# Patient Record
Sex: MALE | Race: WHITE | Employment: UNEMPLOYED | URBAN - METROPOLITAN AREA
[De-identification: names, ages, dates, MRNs, and addresses within clinical notes are randomized per-mention and may not be internally consistent; named-entity substitution may affect disease eponyms.]

---

## 2017-01-17 DIAGNOSIS — N39.498 OTHER URINARY INCONTINENCE: Primary | ICD-10-CM

## 2017-02-06 DIAGNOSIS — E78.00 PURE HYPERCHOLESTEROLEMIA: Primary | ICD-10-CM

## 2017-02-07 RX ORDER — ATORVASTATIN CALCIUM 20 MG/1
20 TABLET, FILM COATED ORAL DAILY
Qty: 30 TABLET | Refills: 2 | Status: SHIPPED | OUTPATIENT
Start: 2017-02-07 | End: 2017-05-01 | Stop reason: SDUPTHER

## 2017-02-16 ENCOUNTER — HOSPITAL ENCOUNTER (OUTPATIENT)
Age: 37
Setting detail: SPECIMEN
Discharge: HOME | End: 2017-02-16
Payer: MEDICARE

## 2017-02-16 ENCOUNTER — OFFICE VISIT (OUTPATIENT)
Dept: FAMILY MEDICINE CLINIC | Facility: CLINIC | Age: 37
End: 2017-02-16

## 2017-02-16 VITALS
RESPIRATION RATE: 15 BRPM | HEART RATE: 82 BPM | DIASTOLIC BLOOD PRESSURE: 72 MMHG | SYSTOLIC BLOOD PRESSURE: 100 MMHG | TEMPERATURE: 95.8 F

## 2017-02-16 DIAGNOSIS — E78.00 PURE HYPERCHOLESTEROLEMIA: ICD-10-CM

## 2017-02-16 DIAGNOSIS — H00.012 HORDEOLUM EXTERNUM OF RIGHT LOWER EYELID: Primary | ICD-10-CM

## 2017-02-16 LAB
ALT SERPL-CCNC: 40 U/L (ref 5–41)
AST SERPL-CCNC: 31 U/L
CHOLESTEROL/HDL RATIO: 2.9
CHOLESTEROL: 153 MG/DL
FOLATE: >20 NG/ML
HDLC SERPL-MCNC: 52 MG/DL
LDL CHOLESTEROL: 84 MG/DL (ref 0–130)
TRIGL SERPL-MCNC: 85 MG/DL
VLDLC SERPL CALC-MCNC: NORMAL MG/DL (ref 1–30)

## 2017-02-16 PROCEDURE — G8484 FLU IMMUNIZE NO ADMIN: HCPCS | Performed by: NURSE PRACTITIONER

## 2017-02-16 PROCEDURE — 1036F TOBACCO NON-USER: CPT | Performed by: NURSE PRACTITIONER

## 2017-02-16 PROCEDURE — 99213 OFFICE O/P EST LOW 20 MIN: CPT | Performed by: NURSE PRACTITIONER

## 2017-02-16 PROCEDURE — G8427 DOCREV CUR MEDS BY ELIG CLIN: HCPCS | Performed by: NURSE PRACTITIONER

## 2017-02-16 PROCEDURE — G8421 BMI NOT CALCULATED: HCPCS | Performed by: NURSE PRACTITIONER

## 2017-02-16 RX ORDER — ERYTHROMYCIN 5 MG/G
OINTMENT OPHTHALMIC
Qty: 1 G | Refills: 0 | Status: SHIPPED | OUTPATIENT
Start: 2017-02-16 | End: 2017-04-17 | Stop reason: ALTCHOICE

## 2017-02-16 RX ORDER — ERYTHROMYCIN 5 MG/G
OINTMENT OPHTHALMIC
Qty: 1 G | Refills: 0 | Status: SHIPPED | OUTPATIENT
Start: 2017-02-16 | End: 2017-02-16

## 2017-02-16 ASSESSMENT — ENCOUNTER SYMPTOMS
NAUSEA: 0
SORE THROAT: 0
EYE PAIN: 0
EYE REDNESS: 0
EYE ITCHING: 1
VOMITING: 0
SINUS PRESSURE: 0
SHORTNESS OF BREATH: 0
RESPIRATORY NEGATIVE: 1
EYE DISCHARGE: 1

## 2017-03-02 RX ORDER — LEVETIRACETAM 250 MG/1
TABLET ORAL
Qty: 62 TABLET | Refills: 2 | Status: SHIPPED | OUTPATIENT
Start: 2017-03-02 | End: 2017-06-05 | Stop reason: SDUPTHER

## 2017-04-01 DIAGNOSIS — N39.498 OTHER URINARY INCONTINENCE: Primary | ICD-10-CM

## 2017-04-17 ENCOUNTER — OFFICE VISIT (OUTPATIENT)
Dept: FAMILY MEDICINE CLINIC | Age: 37
End: 2017-04-17
Payer: MEDICARE

## 2017-04-17 VITALS
BODY MASS INDEX: 17.75 KG/M2 | SYSTOLIC BLOOD PRESSURE: 104 MMHG | HEART RATE: 88 BPM | TEMPERATURE: 96.4 F | DIASTOLIC BLOOD PRESSURE: 62 MMHG | WEIGHT: 123.7 LBS | RESPIRATION RATE: 20 BRPM

## 2017-04-17 DIAGNOSIS — H11.221: ICD-10-CM

## 2017-04-17 DIAGNOSIS — F07.81 POST TRAUMATIC ENCEPHALOPATHY: ICD-10-CM

## 2017-04-17 DIAGNOSIS — E78.00 PURE HYPERCHOLESTEROLEMIA: Primary | ICD-10-CM

## 2017-04-17 DIAGNOSIS — S06.9X9S TBI (TRAUMATIC BRAIN INJURY), WITH LOSS OF CONSCIOUSNESS OF UNSPECIFIED DURATION, SEQUELA: ICD-10-CM

## 2017-04-17 DIAGNOSIS — G40.804 OTHER INTRACTABLE EPILEPSY WITHOUT STATUS EPILEPTICUS (HCC): ICD-10-CM

## 2017-04-17 PROCEDURE — G8419 CALC BMI OUT NRM PARAM NOF/U: HCPCS | Performed by: PEDIATRICS

## 2017-04-17 PROCEDURE — G8427 DOCREV CUR MEDS BY ELIG CLIN: HCPCS | Performed by: PEDIATRICS

## 2017-04-17 PROCEDURE — 99214 OFFICE O/P EST MOD 30 MIN: CPT | Performed by: PEDIATRICS

## 2017-04-17 PROCEDURE — 1036F TOBACCO NON-USER: CPT | Performed by: PEDIATRICS

## 2017-04-17 ASSESSMENT — PATIENT HEALTH QUESTIONNAIRE - PHQ9
SUM OF ALL RESPONSES TO PHQ9 QUESTIONS 1 & 2: 0
SUM OF ALL RESPONSES TO PHQ QUESTIONS 1-9: 0
1. LITTLE INTEREST OR PLEASURE IN DOING THINGS: 0
2. FEELING DOWN, DEPRESSED OR HOPELESS: 0

## 2017-04-17 ASSESSMENT — ENCOUNTER SYMPTOMS
EYE DISCHARGE: 1
DIARRHEA: 0
NAUSEA: 0
WHEEZING: 0
COUGH: 0
CONSTIPATION: 0
SHORTNESS OF BREATH: 0

## 2017-05-01 DIAGNOSIS — N39.498 OTHER URINARY INCONTINENCE: ICD-10-CM

## 2017-05-01 DIAGNOSIS — E78.00 PURE HYPERCHOLESTEROLEMIA: ICD-10-CM

## 2017-05-01 RX ORDER — ATORVASTATIN CALCIUM 20 MG/1
20 TABLET, FILM COATED ORAL EVERY EVENING
Qty: 30 TABLET | Refills: 9 | Status: SHIPPED | OUTPATIENT
Start: 2017-05-01 | End: 2018-02-27 | Stop reason: SDUPTHER

## 2017-05-01 RX ORDER — FESOTERODINE FUMARATE 8 MG/1
1 TABLET, EXTENDED RELEASE ORAL NIGHTLY
Qty: 31 TABLET | Refills: 5 | Status: SHIPPED | OUTPATIENT
Start: 2017-05-01 | End: 2017-11-06 | Stop reason: SDUPTHER

## 2017-05-23 RX ORDER — FOLIC ACID 1 MG/1
1 TABLET ORAL DAILY
Qty: 30 TABLET | Refills: 11 | Status: SHIPPED | OUTPATIENT
Start: 2017-05-23 | End: 2018-03-27 | Stop reason: SDUPTHER

## 2017-07-10 ENCOUNTER — TELEPHONE (OUTPATIENT)
Dept: FAMILY MEDICINE CLINIC | Age: 37
End: 2017-07-10

## 2017-07-10 RX ORDER — ACYCLOVIR 400 MG/1
400 TABLET ORAL 3 TIMES DAILY
Qty: 15 TABLET | Refills: 1 | Status: SHIPPED | OUTPATIENT
Start: 2017-07-10 | End: 2017-07-15

## 2017-07-10 RX ORDER — ACYCLOVIR 50 MG/G
OINTMENT TOPICAL
Qty: 15 G | Refills: 1 | Status: SHIPPED | OUTPATIENT
Start: 2017-07-10 | End: 2017-07-10

## 2017-08-17 ENCOUNTER — HOSPITAL ENCOUNTER (OUTPATIENT)
Age: 37
Setting detail: SPECIMEN
Discharge: HOME | End: 2017-08-17
Payer: MEDICARE

## 2017-08-21 LAB — SURGICAL PATHOLOGY REPORT: NORMAL

## 2017-09-08 RX ORDER — LEVETIRACETAM 250 MG/1
TABLET ORAL
Qty: 62 TABLET | Refills: 0 | Status: SHIPPED | OUTPATIENT
Start: 2017-09-08 | End: 2019-01-22 | Stop reason: SDUPTHER

## 2017-09-12 ENCOUNTER — OFFICE VISIT (OUTPATIENT)
Dept: NEUROLOGY | Age: 37
End: 2017-09-12
Payer: MEDICARE

## 2017-09-12 VITALS — SYSTOLIC BLOOD PRESSURE: 92 MMHG | HEART RATE: 67 BPM | DIASTOLIC BLOOD PRESSURE: 69 MMHG

## 2017-09-12 DIAGNOSIS — G40.909 SEIZURE DISORDER (HCC): ICD-10-CM

## 2017-09-12 DIAGNOSIS — F07.81 POST TRAUMATIC ENCEPHALOPATHY: Primary | ICD-10-CM

## 2017-09-12 PROCEDURE — G8418 CALC BMI BLW LOW PARAM F/U: HCPCS | Performed by: PSYCHIATRY & NEUROLOGY

## 2017-09-12 PROCEDURE — 1036F TOBACCO NON-USER: CPT | Performed by: PSYCHIATRY & NEUROLOGY

## 2017-09-12 PROCEDURE — 99214 OFFICE O/P EST MOD 30 MIN: CPT | Performed by: PSYCHIATRY & NEUROLOGY

## 2017-09-12 PROCEDURE — G8427 DOCREV CUR MEDS BY ELIG CLIN: HCPCS | Performed by: PSYCHIATRY & NEUROLOGY

## 2017-09-12 RX ORDER — LEVETIRACETAM 250 MG/1
TABLET ORAL
Qty: 60 TABLET | Refills: 11 | Status: SHIPPED | OUTPATIENT
Start: 2017-09-12 | End: 2018-09-18 | Stop reason: SDUPTHER

## 2017-09-12 ASSESSMENT — ENCOUNTER SYMPTOMS
GASTROINTESTINAL NEGATIVE: 1
EYES NEGATIVE: 1
RESPIRATORY NEGATIVE: 1

## 2017-10-02 RX ORDER — POLYETHYLENE GLYCOL 3350 17 G/17G
POWDER, FOR SOLUTION ORAL
Qty: 527 G | Refills: 0 | Status: SHIPPED | OUTPATIENT
Start: 2017-10-02 | End: 2018-01-24 | Stop reason: SDUPTHER

## 2017-10-02 NOTE — TELEPHONE ENCOUNTER
LOV: 04/17/17  LR: 08/24/16    Health Maintenance   Topic Date Due    HIV screen  12/05/1994    Flu vaccine (1) 09/01/2017    DTaP/Tdap/Td vaccine (2 - Td) 04/25/2024             (applicable per patient's age: Cancer Screenings, Depression Screening, Fall Risk Screening, Immunizations)    LDL Cholesterol (mg/dL)   Date Value   02/16/2017 84     LDL Calculated (mg/dL)   Date Value   04/19/2016 104     AST (U/L)   Date Value   02/16/2017 31     ALT (U/L)   Date Value   02/16/2017 40     BUN (mg/dL)   Date Value   04/19/2016 7      (goal A1C is < 7)   (goal LDL is <100) need 30-50% reduction from baseline     BP Readings from Last 3 Encounters:   09/12/17 92/69   04/17/17 104/62   02/16/17 100/72    (goal /80)      All Future Testing planned in [facility]:  Lab Frequency Next Occurrence       Next Visit Date:  Future Appointments  Date Time Provider Cele Xiong   9/25/2018 2:40 PM Areli Santoyo MD Neuro Spec [facility]            Patient Active Problem List:     TBI (traumatic brain injury) [facility])     Hyperlipidemia     Intractable epilepsy ([facility] [address]     Narcolepsy     Urinary incontinence     Post traumatic encephalopathy

## 2017-10-16 DIAGNOSIS — E55.9 VITAMIN D DEFICIENCY: ICD-10-CM

## 2017-10-17 NOTE — TELEPHONE ENCOUNTER
Last refill was 10/6/2016 #30-11  Last visit was 4/17/2017 RTO 6 months  Lm for parent to call the office and follow up.    Health Maintenance   Topic Date Due    HIV screen  12/05/1994    Flu vaccine (1) 09/01/2017    DTaP/Tdap/Td vaccine (2 - Td) 04/25/2024             (applicable per patient's age: Cancer Screenings, Depression Screening, Fall Risk Screening, Immunizations)    LDL Cholesterol (mg/dL)   Date Value   02/16/2017 84     LDL Calculated (mg/dL)   Date Value   04/19/2016 104     AST (U/L)   Date Value   02/16/2017 31     ALT (U/L)   Date Value   02/16/2017 40     BUN (mg/dL)   Date Value   04/19/2016 7      (goal A1C is < 7)   (goal LDL is <100) need 30-50% reduction from baseline     BP Readings from Last 3 Encounters:   09/12/17 92/69   04/17/17 104/62   02/16/17 100/72    (goal /80)      All Future Testing planned in [facility]:  Lab Frequency Next Occurrence       Next Visit Date:  Future Appointments  Date Time Provider Cele Xiong   9/25/2018 2:40 PM Maurizio Hernández MD Neuro Spec [facility]            Patient Active Problem List:     TBI (traumatic brain injury) [facility])     Hyperlipidemia     Intractable epilepsy ([facility] [address]     Narcolepsy     Urinary incontinence     Post traumatic encephalopathy

## 2017-10-18 RX ORDER — CHOLECALCIFEROL (VITAMIN D3) 50 MCG
2000 TABLET ORAL DAILY
Qty: 30 TABLET | Refills: 11 | Status: SHIPPED | OUTPATIENT
Start: 2017-10-18 | End: 2018-10-03 | Stop reason: SDUPTHER

## 2017-11-06 DIAGNOSIS — N39.498 OTHER URINARY INCONTINENCE: ICD-10-CM

## 2017-11-07 RX ORDER — FESOTERODINE FUMARATE 8 MG/1
1 TABLET, EXTENDED RELEASE ORAL NIGHTLY
Qty: 30 TABLET | Refills: 0 | Status: SHIPPED | OUTPATIENT
Start: 2017-11-07 | End: 2017-11-30 | Stop reason: SDUPTHER

## 2017-11-07 NOTE — TELEPHONE ENCOUNTER
OV 9/12/17  LRF 5/1/17    Health Maintenance   Topic Date Due    HIV screen  12/05/1994    Flu vaccine (1) 09/01/2017    DTaP/Tdap/Td vaccine (2 - Td) 04/25/2024             (applicable per patient's age: Cancer Screenings, Depression Screening, Fall Risk Screening, Immunizations)    LDL Cholesterol (mg/dL)   Date Value   02/16/2017 84     LDL Calculated (mg/dL)   Date Value   04/19/2016 104     AST (U/L)   Date Value   02/16/2017 31     ALT (U/L)   Date Value   02/16/2017 40     BUN (mg/dL)   Date Value   04/19/2016 7      (goal A1C is < 7)   (goal LDL is <100) need 30-50% reduction from baseline     BP Readings from Last 3 Encounters:   09/12/17 92/69   04/17/17 104/62   02/16/17 100/72    (goal /80)      All Future Testing planned in [facility]:  Lab Frequency Next Occurrence       Next Visit Date:  Future Appointments  Date Time Provider Cele Xiong   9/25/2018 2:40 PM Keisha Samson MD Neuro Spec [address] [facility]            Patient Active Problem List:     TBI (traumatic brain injury) [facility])     Hyperlipidemia     Intractable epilepsy ([facility] [address]     Narcolepsy     Urinary incontinence     Post traumatic encephalopathy

## 2017-11-30 DIAGNOSIS — N39.498 OTHER URINARY INCONTINENCE: ICD-10-CM

## 2017-12-01 RX ORDER — FESOTERODINE FUMARATE 8 MG/1
1 TABLET, EXTENDED RELEASE ORAL DAILY
Qty: 30 TABLET | Refills: 1 | Status: SHIPPED | OUTPATIENT
Start: 2017-12-01 | End: 2018-01-25 | Stop reason: SDUPTHER

## 2017-12-01 NOTE — TELEPHONE ENCOUNTER
LOV: 04/17/17  RTO: in 6 mths.   [facility] for pt/family to contact our office to schedule)  LR: 11/07/17    Health Maintenance   Topic Date Due    HIV screen  12/05/1994    Flu vaccine (1) 09/01/2017    DTaP/Tdap/Td vaccine (2 - Td) 04/25/2024             (applicable per patient's age: Cancer Screenings, Depression Screening, Fall Risk Screening, Immunizations)    LDL Cholesterol (mg/dL)   Date Value   02/16/2017 84     LDL Calculated (mg/dL)   Date Value   04/19/2016 104     AST (U/L)   Date Value   02/16/2017 31     ALT (U/L)   Date Value   02/16/2017 40     BUN (mg/dL)   Date Value   04/19/2016 7      (goal A1C is < 7)   (goal LDL is <100) need 30-50% reduction from baseline     BP Readings from Last 3 Encounters:   09/12/17 92/69   04/17/17 104/62   02/16/17 100/72    (goal /80)      All Future Testing planned in [facility]:  Lab Frequency Next Occurrence       Next Visit Date:  Future Appointments  Date Time Provider Cele Xiong   9/25/2018 2:40 PM Mauro Benitez MD Neuro Spec [address]            Patient Active Problem List:     TBI (traumatic brain injury) [facility])     Hyperlipidemia     Intractable epilepsy ([facility] [address]     Narcolepsy     Urinary incontinence     Post traumatic encephalopathy

## 2018-02-27 DIAGNOSIS — B37.2 CANDIDAL DERMATITIS: ICD-10-CM

## 2018-02-27 DIAGNOSIS — E78.00 PURE HYPERCHOLESTEROLEMIA: Primary | ICD-10-CM

## 2018-02-28 RX ORDER — NYSTATIN 100000 [USP'U]/G
POWDER TOPICAL
Qty: 30 G | Refills: 3 | Status: SHIPPED | OUTPATIENT
Start: 2018-02-28 | End: 2018-09-18 | Stop reason: ALTCHOICE

## 2018-02-28 RX ORDER — ATORVASTATIN CALCIUM 20 MG/1
20 TABLET, FILM COATED ORAL DAILY
Qty: 30 TABLET | Refills: 1 | Status: SHIPPED | OUTPATIENT
Start: 2018-02-28 | End: 2018-05-08 | Stop reason: SDUPTHER

## 2018-03-27 DIAGNOSIS — N39.498 OTHER URINARY INCONTINENCE: ICD-10-CM

## 2018-03-29 RX ORDER — FESOTERODINE FUMARATE 8 MG/1
1 TABLET, EXTENDED RELEASE ORAL DAILY
Qty: 30 TABLET | Refills: 2 | Status: SHIPPED | OUTPATIENT
Start: 2018-03-29 | End: 2018-05-01 | Stop reason: SDUPTHER

## 2018-03-29 RX ORDER — FOLIC ACID 1 MG/1
1 TABLET ORAL DAILY
Qty: 30 TABLET | Refills: 2 | Status: SHIPPED | OUTPATIENT
Start: 2018-03-29 | End: 2018-07-10 | Stop reason: SDUPTHER

## 2018-05-01 ENCOUNTER — OFFICE VISIT (OUTPATIENT)
Dept: FAMILY MEDICINE CLINIC | Age: 38
End: 2018-05-01
Payer: MEDICARE

## 2018-05-01 VITALS
SYSTOLIC BLOOD PRESSURE: 124 MMHG | HEART RATE: 76 BPM | DIASTOLIC BLOOD PRESSURE: 64 MMHG | WEIGHT: 121.1 LBS | TEMPERATURE: 99.8 F | RESPIRATION RATE: 18 BRPM | BODY MASS INDEX: 17.38 KG/M2

## 2018-05-01 DIAGNOSIS — N39.498 OTHER URINARY INCONTINENCE: ICD-10-CM

## 2018-05-01 DIAGNOSIS — B37.0 ORAL THRUSH: ICD-10-CM

## 2018-05-01 DIAGNOSIS — E78.00 PURE HYPERCHOLESTEROLEMIA: ICD-10-CM

## 2018-05-01 DIAGNOSIS — Z11.4 ENCOUNTER FOR SCREENING FOR HIV: ICD-10-CM

## 2018-05-01 DIAGNOSIS — Z00.00 PHYSICAL EXAM, ANNUAL: Primary | ICD-10-CM

## 2018-05-01 DIAGNOSIS — G40.804 OTHER INTRACTABLE EPILEPSY WITHOUT STATUS EPILEPTICUS (HCC): ICD-10-CM

## 2018-05-01 PROCEDURE — G0439 PPPS, SUBSEQ VISIT: HCPCS | Performed by: NURSE PRACTITIONER

## 2018-05-01 RX ORDER — FESOTERODINE FUMARATE 8 MG/1
1 TABLET, EXTENDED RELEASE ORAL NIGHTLY
Qty: 30 TABLET | Refills: 2 | Status: SHIPPED | OUTPATIENT
Start: 2018-05-01 | End: 2018-10-02 | Stop reason: SDUPTHER

## 2018-05-01 ASSESSMENT — ENCOUNTER SYMPTOMS
EYE DISCHARGE: 0
NAUSEA: 0
SHORTNESS OF BREATH: 0
COUGH: 0
VOMITING: 0
WHEEZING: 0
ABDOMINAL DISTENTION: 0
BACK PAIN: 0
DIARRHEA: 0
EYE REDNESS: 0
SINUS PRESSURE: 0
CHEST TIGHTNESS: 0
EYE PAIN: 0
ABDOMINAL PAIN: 0
RHINORRHEA: 0
BLOOD IN STOOL: 0
SORE THROAT: 0

## 2018-05-01 ASSESSMENT — PATIENT HEALTH QUESTIONNAIRE - PHQ9
SUM OF ALL RESPONSES TO PHQ QUESTIONS 1-9: 0
SUM OF ALL RESPONSES TO PHQ9 QUESTIONS 1 & 2: 0
1. LITTLE INTEREST OR PLEASURE IN DOING THINGS: 0
2. FEELING DOWN, DEPRESSED OR HOPELESS: 0

## 2018-05-08 DIAGNOSIS — E78.00 PURE HYPERCHOLESTEROLEMIA: ICD-10-CM

## 2018-05-08 RX ORDER — ATORVASTATIN CALCIUM 20 MG/1
20 TABLET, FILM COATED ORAL DAILY
Qty: 30 TABLET | Refills: 5 | Status: SHIPPED | OUTPATIENT
Start: 2018-05-08 | End: 2018-11-06 | Stop reason: SDUPTHER

## 2018-05-30 DIAGNOSIS — B37.0 ORAL THRUSH: ICD-10-CM

## 2018-06-02 DIAGNOSIS — B37.0 ORAL THRUSH: ICD-10-CM

## 2018-06-12 ENCOUNTER — HOSPITAL ENCOUNTER (OUTPATIENT)
Age: 38
Setting detail: SPECIMEN
Discharge: HOME | End: 2018-06-12
Payer: MEDICARE

## 2018-06-12 LAB
ALBUMIN SERPL-MCNC: 4.4 G/DL (ref 3.5–5.2)
ALBUMIN/GLOBULIN RATIO: 1.5 (ref 1–2.5)
ALP BLD-CCNC: 74 U/L (ref 40–129)
ALT SERPL-CCNC: 65 U/L (ref 5–41)
ANION GAP SERPL CALCULATED.3IONS-SCNC: 15 MMOL/L (ref 9–17)
AST SERPL-CCNC: 47 U/L
BILIRUB SERPL-MCNC: 0.61 MG/DL (ref 0.3–1.2)
BUN BLDV-MCNC: 8 MG/DL (ref 6–20)
BUN/CREAT BLD: ABNORMAL (ref 9–20)
CALCIUM SERPL-MCNC: 9.1 MG/DL (ref 8.6–10.4)
CHLORIDE BLD-SCNC: 98 MMOL/L (ref 98–107)
CHOLESTEROL, FASTING: 146 MG/DL
CHOLESTEROL/HDL RATIO: 3.2
CO2: 27 MMOL/L (ref 20–31)
CREAT SERPL-MCNC: 0.79 MG/DL (ref 0.7–1.2)
GFR AFRICAN AMERICAN: >60 ML/MIN
GFR NON-AFRICAN AMERICAN: >60 ML/MIN
GFR SERPL CREATININE-BSD FRML MDRD: ABNORMAL ML/MIN/{1.73_M2}
GFR SERPL CREATININE-BSD FRML MDRD: ABNORMAL ML/MIN/{1.73_M2}
GLUCOSE FASTING: 73 MG/DL (ref 70–99)
HDLC SERPL-MCNC: 46 MG/DL
LDL CHOLESTEROL: 84 MG/DL (ref 0–130)
POTASSIUM SERPL-SCNC: 4.5 MMOL/L (ref 3.7–5.3)
SODIUM BLD-SCNC: 140 MMOL/L (ref 135–144)
TOTAL PROTEIN: 7.3 G/DL (ref 6.4–8.3)
TRIGLYCERIDE, FASTING: 79 MG/DL
VLDLC SERPL CALC-MCNC: NORMAL MG/DL (ref 1–30)

## 2018-06-13 DIAGNOSIS — R74.8 ELEVATED LIVER ENZYMES: Primary | ICD-10-CM

## 2018-06-13 LAB — HIV AG/AB: NONREACTIVE

## 2018-07-12 RX ORDER — FOLIC ACID 1 MG/1
1 TABLET ORAL DAILY
Qty: 30 TABLET | Refills: 5 | Status: SHIPPED | OUTPATIENT
Start: 2018-07-12 | End: 2019-01-17 | Stop reason: SDUPTHER

## 2018-07-16 DIAGNOSIS — N39.498 OTHER URINARY INCONTINENCE: Primary | ICD-10-CM

## 2018-07-16 NOTE — TELEPHONE ENCOUNTER
Unable to find each only given bottle or ML for choices . LOV was 5-1-18, LR was 4-3-17 on underpads and 1-18-17 on underwear. cm  Next Visit Date:  Future Appointments  Date Time Provider Cele Xiong   9/18/2018 4:20 PM Erik Pitts MD Neuro Spec [address] Maintenance   Topic Date Due    Flu vaccine (1) 09/01/2018    DTaP/Tdap/Td vaccine (2 - Td) 04/25/2024    HIV screen  Completed       No results found for: LABA1C          ( goal A1C is < 7)   No results found for: LABMICR  LDL Cholesterol (mg/dL)   Date Value   06/12/2018 84   02/16/2017 84     LDL Calculated (mg/dL)   Date Value   04/19/2016 104   05/26/2015 88       (goal LDL is <100)   AST (U/L)   Date Value   06/12/2018 47 (H)     ALT (U/L)   Date Value   06/12/2018 65 (H)     BUN (mg/dL)   Date Value   06/12/2018 8     BP Readings from Last 3 Encounters:   05/01/18 124/64   09/12/17 92/69   04/17/17 104/62          (goal 120/80)    All Future Testing planned in [facility]  Lab Frequency Next Occurrence   Lipid Panel Once 03/05/2018   Comprehensive Metabolic Panel Once 21/58/0749   HIV Screen Once 05/01/2018   ALT Once 07/25/2018   AST Once 07/25/2018               Patient Active Problem List:     TBI (traumatic brain injury) ([facility] [address]     Hyperlipidemia     Intractable epilepsy ([facility] [address]     Narcolepsy     Urinary incontinence     Post traumatic encephalopathy

## 2018-09-18 ENCOUNTER — OFFICE VISIT (OUTPATIENT)
Dept: NEUROLOGY | Age: 38
End: 2018-09-18
Payer: MEDICARE

## 2018-09-18 VITALS
SYSTOLIC BLOOD PRESSURE: 106 MMHG | DIASTOLIC BLOOD PRESSURE: 74 MMHG | WEIGHT: 140 LBS | BODY MASS INDEX: 20.04 KG/M2 | HEIGHT: 70 IN | HEART RATE: 60 BPM

## 2018-09-18 DIAGNOSIS — G40.909 SEIZURE DISORDER (HCC): ICD-10-CM

## 2018-09-18 DIAGNOSIS — F07.81 POST TRAUMATIC ENCEPHALOPATHY: Primary | ICD-10-CM

## 2018-09-18 PROCEDURE — 1036F TOBACCO NON-USER: CPT | Performed by: PSYCHIATRY & NEUROLOGY

## 2018-09-18 PROCEDURE — G8420 CALC BMI NORM PARAMETERS: HCPCS | Performed by: PSYCHIATRY & NEUROLOGY

## 2018-09-18 PROCEDURE — G8427 DOCREV CUR MEDS BY ELIG CLIN: HCPCS | Performed by: PSYCHIATRY & NEUROLOGY

## 2018-09-18 PROCEDURE — 99214 OFFICE O/P EST MOD 30 MIN: CPT | Performed by: PSYCHIATRY & NEUROLOGY

## 2018-09-18 RX ORDER — LEVETIRACETAM 250 MG/1
TABLET ORAL
Qty: 60 TABLET | Refills: 11 | Status: SHIPPED | OUTPATIENT
Start: 2018-09-18 | End: 2019-10-08 | Stop reason: SDUPTHER

## 2018-09-18 ASSESSMENT — ENCOUNTER SYMPTOMS
RESPIRATORY NEGATIVE: 1
EYES NEGATIVE: 1
GASTROINTESTINAL NEGATIVE: 1

## 2018-09-18 NOTE — LETTER
sleep latency 9.5 minutes with 2 out of 4 REM onset periods. MRI of Head with left frontal and temporal along with posterior right temporal and right inferior cerebellar encephalomalacia , June 2014 .  Depakote level 84, May 2015      Past Medical History:   Diagnosis Date    Adjustment disorder     Dementia     Dystonia     Encephalopathy     Encephalopathy, unspecified     Esophageal reflux     Hypercholesteremia     Hyperlipidemia     Localization-related (focal) (partial) epilepsy and epileptic syndromes with simple partial seizures, without mention of intractable epilepsy     Neuropathy     Neuropathy     Persistent disorder of initiating or maintaining wakefulness     Unspecified epilepsy with intractable epilepsy        Past Surgical History:   Procedure Laterality Date    CRANIECTOMY  5/28/2004    CRANIOPLASTY  9/15/2004    EYE SURGERY Right     stye removal    OTHER SURGICAL HISTORY  5/28/2004    ICP Monitor       Family History   Problem Relation Age of Onset    Cancer Maternal Grandmother     High Cholesterol Maternal Grandmother     Other Maternal Grandmother         COPD    Coronary Art Dis Maternal Grandfather     Cancer Other         breast    Diabetes Other     Heart Disease Other     Hypertension Other     Migraines Other        Social History     Social History    Marital status: Single     Spouse name: N/A    Number of children: N/A    Years of education: N/A     Social History Main Topics    Smoking status: Former Smoker     Packs/day: 0.50     Quit date: 1/1/2012    Smokeless tobacco: Never Used    Alcohol use 0.0 oz/week      Comment: occasionally    Drug use: No      Comment: past use only    Sexual activity: Not Asked      Comment: past use     Other Topics Concern    None     Social History Narrative    None       Current Outpatient Prescriptions   Medication Sig Dispense Refill    levETIRAcetam (KEPPRA) 250 MG tablet Take 1 tablet by mouth 2 times daily - Every 12 hours 60 tablet 11    Incontinence Supply Disposable (DRI-SORB UNDERPAD) MISC Use as needed for incontinent episodes 180 Bottle 5    Incontinence Supply Disposable (ATTENDS UNDERWEAR 7 MEDIUM) MISC Use twice a day and as needed for incontinent episodes. 994 Bottle 5    folic acid (FOLVITE) 1 MG tablet Take 1 tablet by mouth daily 30 tablet 5    Oral Hygiene Products SWAB Take 1 applicator by mouth 3 times daily 30 each 0    atorvastatin (LIPITOR) 20 MG tablet Take 1 tablet by mouth daily 30 tablet 5    Fesoterodine Fumarate ER (TOVIAZ) 8 MG TB24 Take 1 tablet by mouth nightly 30 tablet 2    polyethylene glycol (GLYCOLAX) powder TAKE 1/2 CAPFUL MIXED WITH LIQUID ON SUNDAY, TUESDAY, THURSDAY MORNINGS. MAY HOLD DOSE IF HAS DIARRHEA. 527 g 1    Cholecalciferol (VITAMIN D) 2000 units TABS tablet Take 1 tablet by mouth daily 30 tablet 11    levETIRAcetam (KEPPRA) 250 MG tablet TAKE 1 TAB BY MOUTH TWICE A DAY EVERY 12 HOURS 62 tablet 0    Incontinence Supply Disposable (DEPEND GUARDS FOR MEN) MISC Use As directed twice a day 180 each 11    Incontinence Supply Disposable (PREVAIL SUPER PLUS SM/MED) MISC 6 each by Other route daily 180 each 11     No current facility-administered medications for this visit. Allergies   Allergen Reactions    Provigil [Modafinil]      Felt like bugs crawling on him           Review of Systems   Constitutional: Positive for fatigue. HENT: Negative. Eyes: Negative. Respiratory: Negative. Cardiovascular: Negative. Gastrointestinal: Negative. Endocrine: Negative. Genitourinary: Positive for frequency and urgency. Musculoskeletal: Positive for gait problem. Skin: Negative. Neurological: Positive for tremors, speech difficulty and weakness. Psychiatric/Behavioral: Negative.         Objective:   Physical Exam    Vitals:    09/18/18 1537   BP: 106/74   Pulse: 60       Neurological Examination  Constitutional .General exam well groomed

## 2018-09-18 NOTE — PROGRESS NOTES
Subjective:      Patient ID: Pennie Sweeney is a 44 y.o. male. HPI    Active Problem post traumatic encephalopathy with previous parenchymal contusions along with hemorrhage needing bifrontal craniotomies. Patient with baseline cognitive dysfunction , unsteady gait along with seizure disorder being on keppra . 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 The condition is he is living in his own appartment with 24 hours staff spending time at home two days a week . His mother reports good mood with occasional dysphoria . There is no agitation or behavioral disturbance. He is on keppra with no seizures . There is occasional paranoia or delusions . There is mild daytime sleepiness  . He remains wheelchair bound with baseline ataxia. Neck dystonia is gone . Significant medications keppra 250 mg po bid. He has been on provigil, neurontin, lyrica, cymbalta , seroquel, risperdal , elavil , atarax , zyprexa , depakote and requip in the past . Testing Head CT with encephalomalacia left frontal parietal and right parietal occipital with bilateral frontal craniotomies , February 2007. LTME no seizure activity seen, Polysomnogram apnea hypopnea index 6 episodes per hour. MSLT mean sleep latency 9.5 minutes with 2 out of 4 REM onset periods. MRI of Head with left frontal and temporal along with posterior right temporal and right inferior cerebellar encephalomalacia , June 2014 .  Depakote level 84, May 2015      Past Medical History:   Diagnosis Date    Adjustment disorder     Dementia     Dystonia     Encephalopathy     Encephalopathy, unspecified     Esophageal reflux     Hypercholesteremia     Hyperlipidemia     Localization-related (focal) (partial) epilepsy and epileptic syndromes with simple partial seizures, without mention of intractable epilepsy

## 2018-09-19 NOTE — COMMUNICATION BODY
Subjective:      Patient ID: Phuong Renee is a 44 y.o. male. HPI    Active Problem post traumatic encephalopathy with previous parenchymal contusions along with hemorrhage needing bifrontal craniotomies. Patient with baseline cognitive dysfunction , unsteady gait along with seizure disorder being on keppra . 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 The condition is he is living in his own appartment with 24 hours staff spending time at home two days a week . His mother reports good mood with occasional dysphoria . There is no agitation or behavioral disturbance. He is on keppra with no seizures . There is occasional paranoia or delusions . There is mild daytime sleepiness  . He remains wheelchair bound with baseline ataxia. Neck dystonia is gone . Significant medications keppra 250 mg po bid. He has been on provigil, neurontin, lyrica, cymbalta , seroquel, risperdal , elavil , atarax , zyprexa , depakote and requip in the past . Testing Head CT with encephalomalacia left frontal parietal and right parietal occipital with bilateral frontal craniotomies , February 2007. LTME no seizure activity seen, Polysomnogram apnea hypopnea index 6 episodes per hour. MSLT mean sleep latency 9.5 minutes with 2 out of 4 REM onset periods. MRI of Head with left frontal and temporal along with posterior right temporal and right inferior cerebellar encephalomalacia , June 2014 .  Depakote level 84, May 2015      Past Medical History:   Diagnosis Date    Adjustment disorder     Dementia     Dystonia     Encephalopathy     Encephalopathy, unspecified     Esophageal reflux     Hypercholesteremia     Hyperlipidemia     Localization-related (focal) (partial) epilepsy and epileptic syndromes with simple partial seizures, without mention of intractable epilepsy nightly 30 tablet 2    polyethylene glycol (GLYCOLAX) powder TAKE 1/2 CAPFUL MIXED WITH LIQUID ON SUNDAY, TUESDAY, THURSDAY MORNINGS. MAY HOLD DOSE IF HAS DIARRHEA. 527 g 1    Cholecalciferol (VITAMIN D) 2000 units TABS tablet Take 1 tablet by mouth daily 30 tablet 11    levETIRAcetam (KEPPRA) 250 MG tablet TAKE 1 TAB BY MOUTH TWICE A DAY EVERY 12 HOURS 62 tablet 0    Incontinence Supply Disposable (DEPEND GUARDS FOR MEN) MISC Use As directed twice a day 180 each 11    Incontinence Supply Disposable (PREVAIL SUPER PLUS SM/MED) MISC 6 each by Other route daily 180 each 11     No current facility-administered medications for this visit. Allergies   Allergen Reactions    Provigil [Modafinil]      Felt like bugs crawling on him           Review of Systems   Constitutional: Positive for fatigue. HENT: Negative. Eyes: Negative. Respiratory: Negative. Cardiovascular: Negative. Gastrointestinal: Negative. Endocrine: Negative. Genitourinary: Positive for frequency and urgency. Musculoskeletal: Positive for gait problem. Skin: Negative. Neurological: Positive for tremors, speech difficulty and weakness. Psychiatric/Behavioral: Negative. Objective:   Physical Exam    Vitals:    09/18/18 1537   BP: 106/74   Pulse: 60       Neurological Examination  Constitutional .General exam well groomed   Ears /Nose/Throat: external ear . Normal exam  Neck and thyroid . Normal size  Respiratory . Breathsounds clear bilaterally  Cardiovascular:  Auscultation of heart with regular rate and rhythm   Musculoskeletal. Neck dystonia                                                               Muscle strength lifting all limbs equally                                                                                 Dysmetria all limbs  Head tremor   Normal fine motor  Ataxic gait    Orientation Alert not oriented   Attention and concentration normal   Short term memory normal   Language process and

## 2018-10-02 DIAGNOSIS — N39.498 OTHER URINARY INCONTINENCE: ICD-10-CM

## 2018-10-03 DIAGNOSIS — E55.9 VITAMIN D DEFICIENCY: ICD-10-CM

## 2018-10-03 RX ORDER — FESOTERODINE FUMARATE 8 MG/1
1 TABLET, EXTENDED RELEASE ORAL NIGHTLY
Qty: 30 TABLET | Refills: 2 | Status: SHIPPED | OUTPATIENT
Start: 2018-10-03 | End: 2019-01-07 | Stop reason: SDUPTHER

## 2018-10-04 RX ORDER — CHOLECALCIFEROL (VITAMIN D3) 50 MCG
2000 TABLET ORAL DAILY
Qty: 30 TABLET | Refills: 5 | Status: SHIPPED | OUTPATIENT
Start: 2018-10-04 | End: 2019-04-03 | Stop reason: SDUPTHER

## 2018-10-10 ENCOUNTER — OFFICE VISIT (OUTPATIENT)
Dept: FAMILY MEDICINE CLINIC | Age: 38
End: 2018-10-10
Payer: MEDICARE

## 2018-10-10 VITALS
TEMPERATURE: 96.2 F | SYSTOLIC BLOOD PRESSURE: 110 MMHG | RESPIRATION RATE: 20 BRPM | HEART RATE: 68 BPM | HEIGHT: 70 IN | BODY MASS INDEX: 20.04 KG/M2 | WEIGHT: 140 LBS | DIASTOLIC BLOOD PRESSURE: 74 MMHG

## 2018-10-10 DIAGNOSIS — B37.0 ORAL THRUSH: Primary | ICD-10-CM

## 2018-10-10 DIAGNOSIS — M20.42 HAMMER TOE OF SECOND TOE OF LEFT FOOT: ICD-10-CM

## 2018-10-10 PROCEDURE — G8427 DOCREV CUR MEDS BY ELIG CLIN: HCPCS | Performed by: NURSE PRACTITIONER

## 2018-10-10 PROCEDURE — 1036F TOBACCO NON-USER: CPT | Performed by: NURSE PRACTITIONER

## 2018-10-10 PROCEDURE — G8484 FLU IMMUNIZE NO ADMIN: HCPCS | Performed by: NURSE PRACTITIONER

## 2018-10-10 PROCEDURE — 99213 OFFICE O/P EST LOW 20 MIN: CPT | Performed by: NURSE PRACTITIONER

## 2018-10-10 PROCEDURE — G8420 CALC BMI NORM PARAMETERS: HCPCS | Performed by: NURSE PRACTITIONER

## 2018-10-10 RX ORDER — FLUCONAZOLE 100 MG/1
TABLET ORAL
Qty: 14 TABLET | Refills: 0 | Status: SHIPPED | OUTPATIENT
Start: 2018-10-10 | End: 2018-10-24

## 2018-10-10 RX ORDER — SIMVASTATIN 40 MG
1 TABLET ORAL EVERY EVENING
COMMUNITY
End: 2019-01-22 | Stop reason: ALTCHOICE

## 2018-10-10 ASSESSMENT — ENCOUNTER SYMPTOMS
SINUS PRESSURE: 0
RHINORRHEA: 0
SHORTNESS OF BREATH: 0
COUGH: 0
SORE THROAT: 0

## 2018-10-10 NOTE — PROGRESS NOTES
sore throat. Thrush   Respiratory: Negative for cough and shortness of breath. Musculoskeletal:        Hammer toe. Worse on Left   Psychiatric/Behavioral: Negative for dysphoric mood and sleep disturbance. The patient is not nervous/anxious. PHQ Scores 5/1/2018 4/17/2017   PHQ2 Score 0 0   PHQ9 Score 0 0     Interpretation of Total Score Depression Severity: 1-4 = Minimal depression, 5-9 = Mild depression, 10-14 = Moderate depression, 15-19 = Moderately severe depression, 20-27 = Severe depression      Objective:     /74 (Site: Left Upper Arm, Position: Sitting, Cuff Size: Medium Adult)   Pulse 68   Temp 96.2 °F (35.7 °C) (Tympanic)   Resp 20   Ht 5' 10\" (1.778 m)   Wt 140 lb (63.5 kg)   BMI 20.09 kg/m²      Physical Exam   Constitutional: He is oriented to person, place, and time. He appears well-developed and well-nourished. No distress. HENT:   Head: Normocephalic. Mouth/Throat: No oropharyngeal exudate. Mild white plaque coating to tongue surface. Musculoskeletal: He exhibits deformity (mild enhanced curvature with mild inflammation noted to left second foot phalanx). Wheelchair for exam   Neurological: He is alert and oriented to person, place, and time. Skin: Skin is warm and dry. No rash noted. He is not diaphoretic. Psychiatric: He has a normal mood and affect. His behavior is normal. Thought content normal.     Assessment:      Diagnosis Orders   1. Oral thrush  fluconazole (DIFLUCAN) 100 MG tablet   2. Hammer toe of second toe of left foot       Plan:     Recommend hammertoe splint. Can buy these online. Podiatry referral if issues persist or worsen. Begin Fluconazole for thrush. Okay to crush. Monitor for improvement. Encouraged healthy diet and exercise. Call office with any new or worsening symptoms or concerns.      Marianne Bosworth received counseling on the following healthy behaviors: nutrition, exercise and medication adherence  Reviewed prior labs and health maintenance. Continue current medications, diet and exercise. Discussed use, benefit, and side effects of prescribed medications. Barriers to medication compliance addressed. Patient given educational materials - see patient instructions. All patient questions answered. Patient voiced understanding.      Electronically signed by RICARDO Briseno CNP on 10/10/2018 at 1:09 PM

## 2018-11-06 DIAGNOSIS — E78.00 PURE HYPERCHOLESTEROLEMIA: ICD-10-CM

## 2018-11-07 RX ORDER — ATORVASTATIN CALCIUM 20 MG/1
20 TABLET, FILM COATED ORAL EVERY EVENING
Qty: 30 TABLET | Refills: 5 | Status: SHIPPED | OUTPATIENT
Start: 2018-11-07 | End: 2019-05-08 | Stop reason: SDUPTHER

## 2019-01-07 DIAGNOSIS — N39.498 OTHER URINARY INCONTINENCE: ICD-10-CM

## 2019-01-09 RX ORDER — FESOTERODINE FUMARATE 8 MG/1
TABLET, EXTENDED RELEASE ORAL
Qty: 30 TABLET | Refills: 3 | Status: SHIPPED | OUTPATIENT
Start: 2019-01-09 | End: 2019-05-08 | Stop reason: SDUPTHER

## 2019-01-22 ENCOUNTER — OFFICE VISIT (OUTPATIENT)
Dept: FAMILY MEDICINE CLINIC | Age: 39
End: 2019-01-22
Payer: MEDICARE

## 2019-01-22 VITALS — DIASTOLIC BLOOD PRESSURE: 84 MMHG | HEART RATE: 74 BPM | SYSTOLIC BLOOD PRESSURE: 118 MMHG | TEMPERATURE: 97.3 F

## 2019-01-22 DIAGNOSIS — G40.804 OTHER INTRACTABLE EPILEPSY WITHOUT STATUS EPILEPTICUS (HCC): ICD-10-CM

## 2019-01-22 DIAGNOSIS — E78.00 PURE HYPERCHOLESTEROLEMIA: Primary | ICD-10-CM

## 2019-01-22 DIAGNOSIS — S06.9X9S TRAUMATIC BRAIN INJURY WITH LOSS OF CONSCIOUSNESS, SEQUELA (HCC): Chronic | ICD-10-CM

## 2019-01-22 DIAGNOSIS — N39.498 OTHER URINARY INCONTINENCE: ICD-10-CM

## 2019-01-22 DIAGNOSIS — Z23 NEED FOR INFLUENZA VACCINATION: ICD-10-CM

## 2019-01-22 PROCEDURE — 90756 CCIIV4 VACC ABX FREE IM: CPT | Performed by: NURSE PRACTITIONER

## 2019-01-22 PROCEDURE — 99214 OFFICE O/P EST MOD 30 MIN: CPT | Performed by: NURSE PRACTITIONER

## 2019-01-22 PROCEDURE — G8420 CALC BMI NORM PARAMETERS: HCPCS | Performed by: NURSE PRACTITIONER

## 2019-01-22 PROCEDURE — G0008 ADMIN INFLUENZA VIRUS VAC: HCPCS | Performed by: NURSE PRACTITIONER

## 2019-01-22 PROCEDURE — 1036F TOBACCO NON-USER: CPT | Performed by: NURSE PRACTITIONER

## 2019-01-22 PROCEDURE — G8427 DOCREV CUR MEDS BY ELIG CLIN: HCPCS | Performed by: NURSE PRACTITIONER

## 2019-01-22 PROCEDURE — G8482 FLU IMMUNIZE ORDER/ADMIN: HCPCS | Performed by: NURSE PRACTITIONER

## 2019-01-22 ASSESSMENT — ENCOUNTER SYMPTOMS
ABDOMINAL DISTENTION: 0
COUGH: 0
BLOOD IN STOOL: 0
EYE PAIN: 0
SHORTNESS OF BREATH: 0
NAUSEA: 0
RHINORRHEA: 0
EYE REDNESS: 0
SORE THROAT: 0
ABDOMINAL PAIN: 0
SINUS PRESSURE: 0
VOMITING: 0
BACK PAIN: 0
DIARRHEA: 0
WHEEZING: 0
CHEST TIGHTNESS: 0
CONSTIPATION: 1
EYE DISCHARGE: 0

## 2019-01-28 DIAGNOSIS — K59.00 CONSTIPATION, UNSPECIFIED CONSTIPATION TYPE: ICD-10-CM

## 2019-01-31 PROBLEM — K59.00 CONSTIPATION: Status: ACTIVE | Noted: 2019-01-31

## 2019-01-31 RX ORDER — POLYETHYLENE GLYCOL 3350 17 G/17G
POWDER, FOR SOLUTION ORAL
Qty: 527 G | Refills: 0 | Status: SHIPPED | OUTPATIENT
Start: 2019-01-31 | End: 2019-07-05 | Stop reason: SDUPTHER

## 2019-02-05 ENCOUNTER — HOSPITAL ENCOUNTER (OUTPATIENT)
Age: 39
Setting detail: SPECIMEN
Discharge: HOME | End: 2019-02-05
Payer: MEDICARE

## 2019-02-05 DIAGNOSIS — R74.8 ELEVATED LIVER ENZYMES: ICD-10-CM

## 2019-02-05 DIAGNOSIS — E78.00 PURE HYPERCHOLESTEROLEMIA: ICD-10-CM

## 2019-02-05 DIAGNOSIS — E78.00 PURE HYPERCHOLESTEROLEMIA: Primary | ICD-10-CM

## 2019-02-05 LAB
ALBUMIN SERPL-MCNC: 4.6 G/DL (ref 3.5–5.2)
ALBUMIN/GLOBULIN RATIO: 2.1 (ref 1–2.5)
ALP BLD-CCNC: 68 U/L (ref 40–129)
ALT SERPL-CCNC: 52 U/L (ref 5–41)
ANION GAP SERPL CALCULATED.3IONS-SCNC: 16 MMOL/L (ref 9–17)
AST SERPL-CCNC: 43 U/L
BILIRUB SERPL-MCNC: 0.58 MG/DL (ref 0.3–1.2)
BUN BLDV-MCNC: 11 MG/DL (ref 6–20)
BUN/CREAT BLD: ABNORMAL (ref 9–20)
CALCIUM SERPL-MCNC: 9.1 MG/DL (ref 8.6–10.4)
CHLORIDE BLD-SCNC: 105 MMOL/L (ref 98–107)
CHOLESTEROL/HDL RATIO: 3.2
CHOLESTEROL: 139 MG/DL
CO2: 27 MMOL/L (ref 20–31)
CREAT SERPL-MCNC: 0.86 MG/DL (ref 0.7–1.2)
GFR AFRICAN AMERICAN: >60 ML/MIN
GFR NON-AFRICAN AMERICAN: >60 ML/MIN
GFR SERPL CREATININE-BSD FRML MDRD: ABNORMAL ML/MIN/{1.73_M2}
GFR SERPL CREATININE-BSD FRML MDRD: ABNORMAL ML/MIN/{1.73_M2}
GLUCOSE BLD-MCNC: 74 MG/DL (ref 70–99)
HCT VFR BLD CALC: 48.2 % (ref 40.7–50.3)
HDLC SERPL-MCNC: 44 MG/DL
HEMOGLOBIN: 15.9 G/DL (ref 13–17)
LDL CHOLESTEROL: 79 MG/DL (ref 0–130)
MCH RBC QN AUTO: 30.4 PG (ref 25.2–33.5)
MCHC RBC AUTO-ENTMCNC: 33 G/DL (ref 28.4–34.8)
MCV RBC AUTO: 92.2 FL (ref 82.6–102.9)
NRBC AUTOMATED: 0 PER 100 WBC
PDW BLD-RTO: 12.6 % (ref 11.8–14.4)
PLATELET # BLD: 204 K/UL (ref 138–453)
PMV BLD AUTO: 12.6 FL (ref 8.1–13.5)
POTASSIUM SERPL-SCNC: 4.8 MMOL/L (ref 3.7–5.3)
RBC # BLD: 5.23 M/UL (ref 4.21–5.77)
SODIUM BLD-SCNC: 148 MMOL/L (ref 135–144)
TOTAL PROTEIN: 6.8 G/DL (ref 6.4–8.3)
TRIGL SERPL-MCNC: 79 MG/DL
VLDLC SERPL CALC-MCNC: NORMAL MG/DL (ref 1–30)
WBC # BLD: 4.3 K/UL (ref 3.5–11.3)

## 2019-05-08 DIAGNOSIS — E78.00 PURE HYPERCHOLESTEROLEMIA: ICD-10-CM

## 2019-05-09 RX ORDER — ATORVASTATIN CALCIUM 20 MG/1
20 TABLET, FILM COATED ORAL EVERY EVENING
Qty: 30 TABLET | Refills: 5 | Status: SHIPPED | OUTPATIENT
Start: 2019-05-09 | End: 2019-11-06 | Stop reason: SDUPTHER

## 2019-05-09 NOTE — TELEPHONE ENCOUNTER
LOV:3-19-25  ROV:6 months  LRF:11-7-18  Health Maintenance   Topic Date Due    DTaP/Tdap/Td vaccine (2 - Td) 04/25/2024    Flu vaccine  Completed    HIV screen  Completed    Pneumococcal 0-64 years Vaccine  Aged Out             (applicable per patient's age: Cancer Screenings, Depression Screening, Fall Risk Screening, Immunizations)    LDL Cholesterol (mg/dL)   Date Value   02/05/2019 79     LDL Calculated (mg/dL)   Date Value   04/19/2016 104     AST (U/L)   Date Value   02/05/2019 43 (H)     ALT (U/L)   Date Value   02/05/2019 52 (H)     BUN (mg/dL)   Date Value   02/05/2019 11      (goal A1C is < 7)   (goal LDL is <100) need 30-50% reduction from baseline     BP Readings from Last 3 Encounters:   01/22/19 118/84   10/10/18 110/74   09/18/18 106/74    (goal /80)      All Future Testing planned in [facility]:  Lab Frequency Next Occurrence   Comprehensive Metabolic Panel Once 70/31/4267   Lipid Panel Once 08/05/2019       Next Visit Date:  Future Appointments   Date Time Provider Cele Xiong   7/22/2019  4:00 PM RICARDO Miner - CNP [place]  [id number]   9/23/2019  4:00 PM Elena Taveras MD Neuro Spec [facility]            Patient Active Problem List:     TBI (traumatic brain injury) [facility])     Hyperlipidemia     Intractable epilepsy ([facility] [address]     Narcolepsy     Urinary incontinence     Post traumatic encephalopathy     Constipation

## 2019-07-29 ENCOUNTER — OFFICE VISIT (OUTPATIENT)
Dept: FAMILY MEDICINE CLINIC | Age: 39
End: 2019-07-29
Payer: MEDICARE

## 2019-07-29 VITALS
SYSTOLIC BLOOD PRESSURE: 120 MMHG | HEART RATE: 76 BPM | RESPIRATION RATE: 14 BRPM | DIASTOLIC BLOOD PRESSURE: 64 MMHG | TEMPERATURE: 97.5 F

## 2019-07-29 DIAGNOSIS — K59.00 CONSTIPATION, UNSPECIFIED CONSTIPATION TYPE: ICD-10-CM

## 2019-07-29 DIAGNOSIS — E78.00 PURE HYPERCHOLESTEROLEMIA: Primary | ICD-10-CM

## 2019-07-29 PROCEDURE — 99214 OFFICE O/P EST MOD 30 MIN: CPT | Performed by: NURSE PRACTITIONER

## 2019-07-29 PROCEDURE — 1036F TOBACCO NON-USER: CPT | Performed by: NURSE PRACTITIONER

## 2019-07-29 PROCEDURE — G8420 CALC BMI NORM PARAMETERS: HCPCS | Performed by: NURSE PRACTITIONER

## 2019-07-29 PROCEDURE — G8427 DOCREV CUR MEDS BY ELIG CLIN: HCPCS | Performed by: NURSE PRACTITIONER

## 2019-07-29 ASSESSMENT — ENCOUNTER SYMPTOMS
EYE PAIN: 0
RHINORRHEA: 0
EYE REDNESS: 0
WHEEZING: 0
SHORTNESS OF BREATH: 0
EYE DISCHARGE: 0
DIARRHEA: 0
SINUS PRESSURE: 0
CONSTIPATION: 1
BLOOD IN STOOL: 0
SORE THROAT: 0
COUGH: 0
ABDOMINAL DISTENTION: 0
NAUSEA: 0
CHEST TIGHTNESS: 0
ABDOMINAL PAIN: 0
BACK PAIN: 0
VOMITING: 0

## 2019-07-29 ASSESSMENT — PATIENT HEALTH QUESTIONNAIRE - PHQ9: DEPRESSION UNABLE TO ASSESS: FUNCTIONAL CAPACITY MOTIVATION LIMITS ACCURACY

## 2019-07-29 NOTE — PATIENT INSTRUCTIONS
diet and lifestyle may help you avoid ongoing constipation. Your doctor may also prescribe medicine to help loosen your stool. Some medicines can cause constipation. These include pain medicines and antidepressants. Tell your doctor about all the medicines you take. Your doctor may want to make a medicine change to ease your symptoms. Follow-up care is a key part of your treatment and safety. Be sure to make and go to all appointments, and call your doctor if you are having problems. It's also a good idea to know your test results and keep a list of the medicines you take. How can you care for yourself at home? · Drink plenty of fluids, enough so that your urine is light yellow or clear like water. If you have kidney, heart, or liver disease and have to limit fluids, talk with your doctor before you increase the amount of fluids you drink. · Include high-fiber foods in your diet each day. These include fruits, vegetables, beans, and whole grains. · Get at least 30 minutes of exercise on most days of the week. Walking is a good choice. You also may want to do other activities, such as running, swimming, cycling, or playing tennis or team sports. · Take a fiber supplement, such as Citrucel or Metamucil, every day. Read and follow all instructions on the label. · Schedule time each day for a bowel movement. A daily routine may help. Take your time having your bowel movement. · Support your feet with a small step stool when you sit on the toilet. This helps flex your hips and places your pelvis in a squatting position. · Your doctor may recommend an over-the-counter laxative to relieve your constipation. Examples are Milk of Magnesia and MiraLax. Read and follow all instructions on the label. Do not use laxatives on a long-term basis. When should you call for help? Call your doctor now or seek immediate medical care if:    · You have new or worse belly pain.     · You have new or worse nausea or vomiting.   · You have blood in your stools.    Watch closely for changes in your health, and be sure to contact your doctor if:    · Your constipation is getting worse.     · You do not get better as expected. Where can you learn more? Go to https://chpegabriella.adhoclabs. org and sign in to your CX account. Enter [phone] in the [employer] box to learn more about \"Constipation: Care Instructions. \"     If you do not have an account, please click on the \"Sign Up Now\" link. Current as of: September 23, 2018  Content Version: 12.0  © 1601-3569 [employer]. Care instructions adapted under license by [facility] ([facility]). If you have questions about a medical condition or this instruction, always ask your healthcare professional. [address] any warranty or liability for your use of this information.

## 2019-07-29 NOTE — PROGRESS NOTES
and gait abnormal.   Severely impaired on chronic basis due to prior trauma. Skin: Skin is warm and dry. Psychiatric: He is agitated (gave provider middle finger when asked a question). Nursing note and vitals reviewed. Assessment:      Diagnosis Orders   1. Pure hypercholesterolemia     2.  Constipation, unspecified constipation type         Lab Results   Component Value Date    WBC 4.3 02/05/2019    HGB 15.9 02/05/2019    HCT 48.2 02/05/2019    MCV 92.2 02/05/2019     02/05/2019       Lab Results   Component Value Date     (H) 02/05/2019    K 4.8 02/05/2019     02/05/2019    CO2 27 02/05/2019    BUN 11 02/05/2019    CREATININE 0.86 02/05/2019    GLUCOSE 74 02/05/2019    CALCIUM 9.1 02/05/2019    PROT 6.8 02/05/2019    LABALBU 4.6 02/05/2019    BILITOT 0.58 02/05/2019    ALKPHOS 68 02/05/2019    AST 43 (H) 02/05/2019    ALT 52 (H) 02/05/2019    LABGLOM >60 02/05/2019    GFRAA >60 02/05/2019       Lab Results   Component Value Date    CHOL 139 02/05/2019    CHOL 153 02/16/2017    CHOL 163 04/19/2016     Lab Results   Component Value Date    TRIG 79 02/05/2019    TRIG 85 02/16/2017    TRIG 86 04/19/2016     Lab Results   Component Value Date    HDL 44 02/05/2019    HDL 46 06/12/2018    HDL 52 02/16/2017     Lab Results   Component Value Date    LDLCHOLESTEROL 79 02/05/2019    LDLCHOLESTEROL 84 06/12/2018    LDLCHOLESTEROL 84 02/16/2017    LDLCALC 104 04/19/2016    LDLCALC 88 05/26/2015    LDLCALC 106 11/29/2013     Lab Results   Component Value Date    VLDL NOT REPORTED 02/05/2019    VLDL NOT REPORTED 06/12/2018    VLDL NOT REPORTED 02/16/2017     Lab Results   Component Value Date    CHOLHDLRATIO 3.2 02/05/2019    CHOLHDLRATIO 3.2 06/12/2018    CHOLHDLRATIO 2.9 02/16/2017       Plan:     Continue to follow up with neurology as planned   24 hour care givers at home   Complete fasting labs next week as ordered  Recommend continue current medications, healthy low fat diet, and regular

## 2019-08-02 ENCOUNTER — TELEPHONE (OUTPATIENT)
Dept: FAMILY MEDICINE CLINIC | Age: 39
End: 2019-08-02

## 2019-08-14 DIAGNOSIS — N39.498 OTHER URINARY INCONTINENCE: ICD-10-CM

## 2019-10-09 ENCOUNTER — TELEPHONE (OUTPATIENT)
Dept: NEUROLOGY | Age: 39
End: 2019-10-09

## 2019-10-09 RX ORDER — LEVETIRACETAM 250 MG/1
TABLET ORAL
Qty: 60 TABLET | Refills: 0 | Status: SHIPPED
Start: 2019-10-09 | End: 2020-02-25 | Stop reason: SDUPTHER

## 2019-10-24 ENCOUNTER — OFFICE VISIT (OUTPATIENT)
Dept: NEUROLOGY | Age: 39
End: 2019-10-24
Payer: MEDICARE

## 2019-10-24 VITALS
HEIGHT: 70 IN | WEIGHT: 140 LBS | SYSTOLIC BLOOD PRESSURE: 99 MMHG | BODY MASS INDEX: 20.04 KG/M2 | DIASTOLIC BLOOD PRESSURE: 66 MMHG | HEART RATE: 96 BPM

## 2019-10-24 DIAGNOSIS — F07.81 POST TRAUMATIC ENCEPHALOPATHY: Primary | ICD-10-CM

## 2019-10-24 DIAGNOSIS — G40.909 SEIZURE DISORDER (HCC): ICD-10-CM

## 2019-10-24 PROCEDURE — G8427 DOCREV CUR MEDS BY ELIG CLIN: HCPCS | Performed by: PSYCHIATRY & NEUROLOGY

## 2019-10-24 PROCEDURE — G8420 CALC BMI NORM PARAMETERS: HCPCS | Performed by: PSYCHIATRY & NEUROLOGY

## 2019-10-24 PROCEDURE — 1036F TOBACCO NON-USER: CPT | Performed by: PSYCHIATRY & NEUROLOGY

## 2019-10-24 PROCEDURE — 99214 OFFICE O/P EST MOD 30 MIN: CPT | Performed by: PSYCHIATRY & NEUROLOGY

## 2019-10-24 PROCEDURE — G8484 FLU IMMUNIZE NO ADMIN: HCPCS | Performed by: PSYCHIATRY & NEUROLOGY

## 2019-10-24 RX ORDER — LEVETIRACETAM 250 MG/1
TABLET ORAL
Qty: 60 TABLET | Refills: 11 | Status: SHIPPED | OUTPATIENT
Start: 2019-10-24 | End: 2020-11-03 | Stop reason: SDUPTHER

## 2019-10-24 ASSESSMENT — ENCOUNTER SYMPTOMS
RESPIRATORY NEGATIVE: 1
GASTROINTESTINAL NEGATIVE: 1
EYES NEGATIVE: 1

## 2019-11-06 DIAGNOSIS — E78.00 PURE HYPERCHOLESTEROLEMIA: ICD-10-CM

## 2019-11-06 DIAGNOSIS — N39.498 OTHER URINARY INCONTINENCE: ICD-10-CM

## 2019-11-07 RX ORDER — ATORVASTATIN CALCIUM 20 MG/1
20 TABLET, FILM COATED ORAL DAILY
Qty: 30 TABLET | Refills: 5 | Status: SHIPPED | OUTPATIENT
Start: 2019-11-07 | End: 2020-04-03 | Stop reason: SDUPTHER

## 2019-11-07 RX ORDER — FESOTERODINE FUMARATE 8 MG/1
1 TABLET, EXTENDED RELEASE ORAL NIGHTLY
Qty: 30 TABLET | Refills: 5 | Status: SHIPPED | OUTPATIENT
Start: 2019-11-07 | End: 2020-05-07

## 2019-11-15 RX ORDER — DIAPER,BRIEF,ADULT, DISPOSABLE
EACH MISCELLANEOUS
Qty: 100 EACH | Refills: 11 | Status: SHIPPED | OUTPATIENT
Start: 2019-11-15 | End: 2021-06-14

## 2019-11-29 LAB
ALBUMIN SERPL-MCNC: 4.6 G/DL
ALP BLD-CCNC: 67 U/L
ALT SERPL-CCNC: 71 U/L
ANION GAP SERPL CALCULATED.3IONS-SCNC: 6 MMOL/L
AST SERPL-CCNC: 38 U/L
BILIRUB SERPL-MCNC: 0.6 MG/DL (ref 0.1–1.4)
BUN BLDV-MCNC: 9 MG/DL
CALCIUM SERPL-MCNC: 9.5 MG/DL
CHLORIDE BLD-SCNC: 105 MMOL/L
CHOLESTEROL, TOTAL: 145 MG/DL
CHOLESTEROL/HDL RATIO: 3.4
CO2: 33 MMOL/L
CREAT SERPL-MCNC: 0.94 MG/DL
GFR CALCULATED: >60
GLUCOSE BLD-MCNC: 87 MG/DL
HDLC SERPL-MCNC: 43 MG/DL (ref 35–70)
LDL CHOLESTEROL CALCULATED: 86 MG/DL (ref 0–160)
POTASSIUM SERPL-SCNC: 4.4 MMOL/L
SODIUM BLD-SCNC: 144 MMOL/L
TOTAL PROTEIN: 7.6
TRIGL SERPL-MCNC: 79 MG/DL
VLDLC SERPL CALC-MCNC: NORMAL MG/DL

## 2019-12-03 DIAGNOSIS — R74.8 ELEVATED LIVER ENZYMES: ICD-10-CM

## 2019-12-03 DIAGNOSIS — E78.00 PURE HYPERCHOLESTEROLEMIA: ICD-10-CM

## 2019-12-05 DIAGNOSIS — R74.8 ELEVATED LIVER ENZYMES: Primary | ICD-10-CM

## 2020-01-06 NOTE — TELEPHONE ENCOUNTER
Last visit: 07/29/2019  Last Med refill: 01/17/2019    Next Visit Date:  Future Appointments   Date Time Provider Cele Xiong   1/28/2020  4:00 PM [facility] [place] [facility]   10/26/2020  4:40 PM Yuri De Souza MD Neuro Spec [address] Maintenance   Topic Date Due    Annual Wellness Visit (AWV)  05/29/2019    Flu vaccine (1) 09/01/2019    Lipid screen  11/29/2020    DTaP/Tdap/Td vaccine (2 - Td) 04/25/2024    HIV screen  Completed    Pneumococcal 0-64 years Vaccine  Aged Out       No results found for: LABA1C          ( goal A1C is < 7)   No results found for: LABMICR  LDL Cholesterol (mg/dL)   Date Value   02/05/2019 79   06/12/2018 84     LDL Calculated (mg/dL)   Date Value   11/29/2019 86   04/19/2016 104       (goal LDL is <100)   AST (U/L)   Date Value   11/29/2019 38     ALT (U/L)   Date Value   11/29/2019 71     BUN (mg/dL)   Date Value   11/29/2019 9     BP Readings from Last 3 Encounters:   10/24/19 99/66   07/29/19 120/64   01/22/19 118/84          (goal 120/80)    All Future Testing planned in [facility]  Lab Frequency Next Occurrence   Hepatic Function Panel Once 01/16/2020               Patient Active Problem List:     TBI (traumatic brain injury) ([facility] [address]     Hyperlipidemia     Intractable epilepsy ([facility] [address]     Narcolepsy     Urinary incontinence     Post traumatic encephalopathy     Constipation

## 2020-01-07 RX ORDER — FOLIC ACID 1 MG/1
TABLET ORAL
Qty: 29 TABLET | Refills: 0 | Status: SHIPPED | OUTPATIENT
Start: 2020-01-07 | End: 2020-01-30

## 2020-02-25 ENCOUNTER — HOSPITAL ENCOUNTER (OUTPATIENT)
Age: 40
Setting detail: SPECIMEN
Discharge: HOME | End: 2020-02-25
Payer: MEDICARE

## 2020-02-25 ENCOUNTER — OFFICE VISIT (OUTPATIENT)
Dept: FAMILY MEDICINE CLINIC | Age: 40
End: 2020-02-25
Payer: MEDICARE

## 2020-02-25 VITALS
RESPIRATION RATE: 16 BRPM | SYSTOLIC BLOOD PRESSURE: 100 MMHG | OXYGEN SATURATION: 96 % | TEMPERATURE: 95.6 F | HEART RATE: 64 BPM | WEIGHT: 140 LBS | BODY MASS INDEX: 20.09 KG/M2 | DIASTOLIC BLOOD PRESSURE: 68 MMHG

## 2020-02-25 LAB
ALBUMIN SERPL-MCNC: 4.4 G/DL (ref 3.5–5.2)
ALBUMIN/GLOBULIN RATIO: 1.5 (ref 1–2.5)
ALP BLD-CCNC: 68 U/L (ref 40–129)
ALT SERPL-CCNC: 44 U/L (ref 5–41)
AST SERPL-CCNC: 33 U/L
BILIRUB SERPL-MCNC: 0.39 MG/DL (ref 0.3–1.2)
BILIRUBIN DIRECT: 0.11 MG/DL
BILIRUBIN, INDIRECT: 0.28 MG/DL (ref 0–1)
GLOBULIN: ABNORMAL G/DL (ref 1.5–3.8)
TOTAL PROTEIN: 7.3 G/DL (ref 6.4–8.3)

## 2020-02-25 PROCEDURE — 90686 IIV4 VACC NO PRSV 0.5 ML IM: CPT | Performed by: NURSE PRACTITIONER

## 2020-02-25 PROCEDURE — G8482 FLU IMMUNIZE ORDER/ADMIN: HCPCS | Performed by: NURSE PRACTITIONER

## 2020-02-25 PROCEDURE — G0008 ADMIN INFLUENZA VIRUS VAC: HCPCS | Performed by: NURSE PRACTITIONER

## 2020-02-25 PROCEDURE — 99214 OFFICE O/P EST MOD 30 MIN: CPT | Performed by: NURSE PRACTITIONER

## 2020-02-25 PROCEDURE — 1036F TOBACCO NON-USER: CPT | Performed by: NURSE PRACTITIONER

## 2020-02-25 PROCEDURE — G8427 DOCREV CUR MEDS BY ELIG CLIN: HCPCS | Performed by: NURSE PRACTITIONER

## 2020-02-25 PROCEDURE — G8420 CALC BMI NORM PARAMETERS: HCPCS | Performed by: NURSE PRACTITIONER

## 2020-02-25 ASSESSMENT — ENCOUNTER SYMPTOMS
EYE DISCHARGE: 0
SINUS PRESSURE: 0
BACK PAIN: 0
BLOOD IN STOOL: 0
CHEST TIGHTNESS: 0
EYE REDNESS: 0
SORE THROAT: 0
RHINORRHEA: 0
DIARRHEA: 0
COUGH: 0
WHEEZING: 0
SHORTNESS OF BREATH: 0
NAUSEA: 0
EYE PAIN: 0
CONSTIPATION: 1
ABDOMINAL PAIN: 0

## 2020-02-25 ASSESSMENT — PATIENT HEALTH QUESTIONNAIRE - PHQ9: DEPRESSION UNABLE TO ASSESS: FUNCTIONAL CAPACITY MOTIVATION LIMITS ACCURACY

## 2020-02-25 NOTE — PROGRESS NOTES
History of Present Illness:     Jayden Solis is a 35 y.o. male who presents in office today with Self and mother   follow up on chronic conditions including:     Patient Active Problem List   Diagnosis    [facility] [address] [facility]    Hyperlipidemia    Intractable epilepsy ([facility] [address] [facility]    Narcolepsy    Urinary incontinence    Post traumatic encephalopathy    Constipation     Here for follow up on elevated liver enzymes. Had high enzymes in November of 2019 and needing recheck. About 3 weeks ago, crawled out of bed and hit above his eye on door frame requiring 2 sutures. These have been removed. Follows with Neurology Dr Kelvin Zambrano yearly, has been stable on medication for a few years. Needs annual physical paperwork. 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 HPI    Patient Care Team:  Dewaine Cranker, APRN - CNP as PCP - General (Nurse Practitioner)    Visit Information    Have you changed or started any medications since your last visit including any over-the-counter medicines, vitamins, or herbal medicines? yes - Med list updated   Are you having any side effects from any of your medications? -  no  Have you stopped taking any of your medications? Is so, why? -  yes - Med list updated  Have you seen any other physician or provider since your last visit? Yes - Records Obtained  Neurology, Dentist, Eye Exam  Have you had any other diagnostic tests since your last visit? No  Have you been seen in the emergency room and/or had an admission to a hospital since we last saw you? Yes [facility] ED stitches  Have you had your routine dental cleaning in the past 6 months? Yes  Have you activated your [employer] account? Yes  If activated, Do you have the mobile nasra and comfortable using functions? No    Reviewed     [x] Past Medical, Family, and Social History was reviewed per writer and does contribute to the patient presenting condition.     [x] Laboratory Results, prior trauma. Psychiatric:         Mood and Affect: Mood normal.       Diagnoses / Plan:     1. Liver function abnormality    - Hepatic Function Panel; Future    2. Pure hypercholesterolemia    - Lipid, Fasting; Future  - TSH with Reflex; Future    3. Other intractable epilepsy without status epilepticus ([facility] [address]  Follows with Neurology. 4. Traumatic brain injury with loss of consciousness, subsequent encounter    - CBC Auto Differential; Future  - Comprehensive Metabolic Panel, Fasting; Future    5. Other urinary incontinence      6. Influenza vaccination administered at current visit    - INFLUENZA, QUADV, 3 YRS AND OLDER, IM PF, PREFILL SYR OR SDV, 0.5ML (RHEA Rosario)     and Electronically signed today by RICARDO Gregory CNP on 2/25/2020 at 6:19 PM    Items for Patient/Writer/Staff to Francisco Ward   Patient: Blood work today, not fasting. , Continue with all other medications as prescribed. , Continue regular follow up's with specialist including but not limited to: Neurology and Mychart already complete or Patient to complete their Mychart Sign up and instructed on easiest way to contact writer. Encouraged healthy diet and exercise. Call office with any new or worsening symptoms or concerns. Nash Neves in room for assessment, diagnosis, and treatment planning. Plan discussed with Hank BAPTISTE, both parties agree on next steps. Return in about 6 months (around 8/25/2020) for Chronic Conditions. \"There is beauty in all things if you choose to see it\"    Hank Winters, MSN, APRN-CNP   [address] [facility]  [email or web address]   Office: [phone]   Cell: [phone]

## 2020-04-04 RX ORDER — ATORVASTATIN CALCIUM 20 MG/1
20 TABLET, FILM COATED ORAL DAILY
Qty: 30 TABLET | Refills: 5 | Status: SHIPPED | OUTPATIENT
Start: 2020-04-04 | End: 2020-05-08

## 2020-04-06 RX ORDER — FOLIC ACID 1 MG/1
1 TABLET ORAL DAILY
Qty: 31 TABLET | Refills: 5 | Status: SHIPPED | OUTPATIENT
Start: 2020-04-06 | End: 2020-10-26

## 2020-05-07 NOTE — TELEPHONE ENCOUNTER
Last visit:2/25/20  Last Med refill: 11/7/019  Does patient have enough medication for 72 hours: No:     Next Visit Date:  Future Appointments   Date Time Provider Cele Inga   10/26/2020  4:40 PM Sandeep Delarosa MD Neuro Spec [address] Maintenance   Topic Date Due    Annual Wellness Visit (AWV)  05/29/2019    Lipid screen  11/29/2020    DTaP/Tdap/Td vaccine (2 - Td) 04/25/2024    Flu vaccine  Completed    HIV screen  Completed    Hepatitis A vaccine  Aged Out    Hepatitis B vaccine  Aged Out    Hib vaccine  Aged Out    Meningococcal (ACWY) vaccine  Aged Out    Pneumococcal 0-64 years Vaccine  Aged Out       No results found for: LABA1C          ( goal A1C is < 7)   No results found for: LABMICR  LDL Cholesterol (mg/dL)   Date Value   02/05/2019 79   06/12/2018 84     LDL Calculated (mg/dL)   Date Value   11/29/2019 86   04/19/2016 104       (goal LDL is <100)   AST (U/L)   Date Value   02/25/2020 33     ALT (U/L)   Date Value   02/25/2020 44 (H)     BUN (mg/dL)   Date Value   11/29/2019 9     BP Readings from Last 3 Encounters:   02/25/20 100/68   10/24/19 99/66   07/29/19 120/64          (goal 120/80)    All Future Testing planned in [facility]  Lab Frequency Next Occurrence   CBC Auto Differential Once 08/25/2020   Comprehensive Metabolic Panel, Fasting Once 08/25/2020   Lipid, Fasting Once 08/25/2020   TSH with Reflex Once 08/25/2020               Patient Active Problem List:     TBI (traumatic brain injury) ([facility] [address]     Hyperlipidemia     Intractable epilepsy ([facility] [address]     Narcolepsy     Urinary incontinence     Post traumatic encephalopathy     Constipation

## 2020-05-08 RX ORDER — ATORVASTATIN CALCIUM 20 MG/1
TABLET, FILM COATED ORAL
Qty: 30 TABLET | Refills: 0 | Status: SHIPPED | OUTPATIENT
Start: 2020-05-08 | End: 2020-06-05

## 2020-05-08 RX ORDER — FESOTERODINE FUMARATE 8 MG/1
8 TABLET, FILM COATED, EXTENDED RELEASE ORAL NIGHTLY
Qty: 30 TABLET | Refills: 0 | Status: SHIPPED | OUTPATIENT
Start: 2020-05-08 | End: 2020-06-05

## 2020-06-07 RX ORDER — FESOTERODINE FUMARATE 8 MG/1
8 TABLET, FILM COATED, EXTENDED RELEASE ORAL EVERY EVENING
Qty: 30 TABLET | Refills: 0 | Status: SHIPPED | OUTPATIENT
Start: 2020-06-07 | End: 2020-08-03 | Stop reason: SDUPTHER

## 2020-06-07 RX ORDER — ATORVASTATIN CALCIUM 20 MG/1
20 TABLET, FILM COATED ORAL DAILY
Qty: 30 TABLET | Refills: 0 | Status: SHIPPED | OUTPATIENT
Start: 2020-06-07 | End: 2020-08-03 | Stop reason: SDUPTHER

## 2020-07-24 ENCOUNTER — TELEPHONE (OUTPATIENT)
Dept: FAMILY MEDICINE CLINIC | Age: 40
End: 2020-07-24

## 2020-07-24 DIAGNOSIS — E78.00 PURE HYPERCHOLESTEROLEMIA: ICD-10-CM

## 2020-07-24 DIAGNOSIS — N39.498 OTHER URINARY INCONTINENCE: ICD-10-CM
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
## 2020-08-03 RX ORDER — FESOTERODINE FUMARATE 8 MG/1
8 TABLET, FILM COATED, EXTENDED RELEASE ORAL EVERY EVENING
Qty: 30 TABLET | Refills: 1 | Status: SHIPPED | OUTPATIENT
Start: 2020-08-03 | End: 2020-09-11

## 2020-08-03 RX ORDER — ATORVASTATIN CALCIUM 20 MG/1
20 TABLET, FILM COATED ORAL DAILY
Qty: 30 TABLET | Refills: 1 | Status: SHIPPED | OUTPATIENT
Start: 2020-08-03 | End: 2020-09-11

## 2020-08-10 ENCOUNTER — TELEPHONE (OUTPATIENT)
Dept: FAMILY MEDICINE CLINIC | Age: 40
End: 2020-08-10

## 2020-08-10 DIAGNOSIS — Z20.822 EXPOSURE TO COVID-19 VIRUS: Primary | ICD-10-CM
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
## 2020-08-13 NOTE — TELEPHONE ENCOUNTER
Mother notified and states she was directed to the hotline number who advised her to contact PCP office. [facility] requested an order for oral mouth swab instead of nasal swab. They will do the testing as long as she has order in computer. Patient was exposed and mother is needing him testing asap it has now been 2 days. Please advise on testing.

## 2020-08-25 ENCOUNTER — HOSPITAL ENCOUNTER (OUTPATIENT)
Age: 40
Setting detail: SPECIMEN
Discharge: HOME | End: 2020-08-25
Payer: MEDICARE

## 2020-08-25 LAB
ABSOLUTE EOS #: 0.11 K/UL (ref 0–0.44)
ABSOLUTE IMMATURE GRANULOCYTE: 0.03 K/UL (ref 0–0.3)
ABSOLUTE LYMPH #: 1.66 K/UL (ref 1.1–3.7)
ABSOLUTE MONO #: 0.33 K/UL (ref 0.1–1.2)
ALBUMIN SERPL-MCNC: 4.5 G/DL (ref 3.5–5.2)
ALBUMIN/GLOBULIN RATIO: 1.7 (ref 1–2.5)
ALP BLD-CCNC: 71 U/L (ref 40–129)
ALT SERPL-CCNC: 73 U/L (ref 5–41)
ANION GAP SERPL CALCULATED.3IONS-SCNC: 15 MMOL/L (ref 9–17)
AST SERPL-CCNC: 44 U/L
BASOPHILS # BLD: 1 % (ref 0–2)
BASOPHILS ABSOLUTE: 0.03 K/UL (ref 0–0.2)
BILIRUB SERPL-MCNC: 0.51 MG/DL (ref 0.3–1.2)
BUN BLDV-MCNC: 12 MG/DL (ref 6–20)
BUN/CREAT BLD: ABNORMAL (ref 9–20)
CALCIUM SERPL-MCNC: 9.3 MG/DL (ref 8.6–10.4)
CHLORIDE BLD-SCNC: 103 MMOL/L (ref 98–107)
CHOLESTEROL, FASTING: 145 MG/DL
CHOLESTEROL/HDL RATIO: 3.2
CO2: 26 MMOL/L (ref 20–31)
CREAT SERPL-MCNC: 0.96 MG/DL (ref 0.7–1.2)
DIFFERENTIAL TYPE: ABNORMAL
EOSINOPHILS RELATIVE PERCENT: 3 % (ref 1–4)
GFR AFRICAN AMERICAN: >60 ML/MIN
GFR NON-AFRICAN AMERICAN: >60 ML/MIN
GFR SERPL CREATININE-BSD FRML MDRD: ABNORMAL ML/MIN/{1.73_M2}
GFR SERPL CREATININE-BSD FRML MDRD: ABNORMAL ML/MIN/{1.73_M2}
GLUCOSE FASTING: 83 MG/DL (ref 70–99)
HCT VFR BLD CALC: 48.6 % (ref 40.7–50.3)
HDLC SERPL-MCNC: 46 MG/DL
HEMOGLOBIN: 16.1 G/DL (ref 13–17)
IMMATURE GRANULOCYTES: 1 %
LDL CHOLESTEROL: 82 MG/DL (ref 0–130)
LYMPHOCYTES # BLD: 40 % (ref 24–43)
MCH RBC QN AUTO: 30.6 PG (ref 25.2–33.5)
MCHC RBC AUTO-ENTMCNC: 33.1 G/DL (ref 28.4–34.8)
MCV RBC AUTO: 92.2 FL (ref 82.6–102.9)
MONOCYTES # BLD: 8 % (ref 3–12)
NRBC AUTOMATED: 0 PER 100 WBC
PDW BLD-RTO: 12.5 % (ref 11.8–14.4)
PLATELET # BLD: 218 K/UL (ref 138–453)
PLATELET ESTIMATE: ABNORMAL
PMV BLD AUTO: 13 FL (ref 8.1–13.5)
POTASSIUM SERPL-SCNC: 4.6 MMOL/L (ref 3.7–5.3)
RBC # BLD: 5.27 M/UL (ref 4.21–5.77)
RBC # BLD: ABNORMAL 10*6/UL
SEG NEUTROPHILS: 47 % (ref 36–65)
SEGMENTED NEUTROPHILS ABSOLUTE COUNT: 2.03 K/UL (ref 1.5–8.1)
SODIUM BLD-SCNC: 144 MMOL/L (ref 135–144)
TOTAL PROTEIN: 7.2 G/DL (ref 6.4–8.3)
TRIGLYCERIDE, FASTING: 86 MG/DL
TSH SERPL DL<=0.05 MIU/L-ACNC: 1.51 MIU/L (ref 0.3–5)
VLDLC SERPL CALC-MCNC: NORMAL MG/DL (ref 1–30)
WBC # BLD: 4.2 K/UL (ref 3.5–11.3)
WBC # BLD: ABNORMAL 10*3/UL

## 2020-09-11 RX ORDER — ATORVASTATIN CALCIUM 20 MG/1
20 TABLET, FILM COATED ORAL DAILY
Qty: 31 TABLET | Refills: 5 | Status: SHIPPED | OUTPATIENT
Start: 2020-09-11 | End: 2021-03-15

## 2020-09-11 RX ORDER — FESOTERODINE FUMARATE 8 MG/1
8 TABLET, FILM COATED, EXTENDED RELEASE ORAL NIGHTLY
Qty: 31 TABLET | Refills: 0 | Status: SHIPPED | OUTPATIENT
Start: 2020-09-11 | End: 2020-09-21 | Stop reason: ALTCHOICE

## 2020-09-11 NOTE — TELEPHONE ENCOUNTER
Last OARRS Review-0  Last Office Visit-02/25/2020  Return To Office-6 months   Next Appointment Date-09/21/2020  Last Refill Date-08/03/2020  , 05/19/2019  Number of Refills Given-       Health Maintenance   Topic Date Due    Annual Wellness Visit (AWV)  05/29/2019    Flu vaccine (1) 09/01/2020    Lipid screen  08/25/2021    DTaP/Tdap/Td vaccine (2 - Td) 04/25/2024    HIV screen  Completed    Hepatitis A vaccine  Aged Out    Hepatitis B vaccine  Aged Out    Hib vaccine  Aged Out    Meningococcal (ACWY) vaccine  Aged Out    Pneumococcal 0-64 years Vaccine  Aged Out             (applicable per patient's age: Cancer Screenings, Depression Screening, Fall Risk Screening, Immunizations)    LDL Cholesterol (mg/dL)   Date Value   08/25/2020 82     LDL Calculated (mg/dL)   Date Value   11/29/2019 86     AST (U/L)   Date Value   08/25/2020 44 (H)     ALT (U/L)   Date Value   08/25/2020 73 (H)     BUN (mg/dL)   Date Value   08/25/2020 12      (goal A1C is < 7)   (goal LDL is <100) need 30-50% reduction from baseline     BP Readings from Last 3 Encounters:   02/25/20 100/68   10/24/19 99/66   07/29/19 120/64    (goal /80)      All Future Testing planned in [facility]:  Lab Frequency Next Occurrence   COVID-19 Ambulatory Once 08/13/2020       Next Visit Date:  Future Appointments   Date Time Provider Cele Xiong   9/21/2020  3:30 PM RICARDO Ramos - CNP [facility]   11/3/2020  4:40 PM Raad Bey MD Neuro Spec [address]            Patient Active Problem List:     TBI (traumatic brain injury) [facility])     Hyperlipidemia     Intractable epilepsy ([address]     Narcolepsy     Urinary incontinence     Post traumatic encephalopathy     Constipation

## 2020-09-21 ENCOUNTER — OFFICE VISIT (OUTPATIENT)
Dept: FAMILY MEDICINE CLINIC | Age: 40
End: 2020-09-21
Payer: MEDICARE

## 2020-09-21 VITALS
WEIGHT: 123.3 LBS | OXYGEN SATURATION: 92 % | HEART RATE: 82 BPM | DIASTOLIC BLOOD PRESSURE: 72 MMHG | RESPIRATION RATE: 17 BRPM | SYSTOLIC BLOOD PRESSURE: 112 MMHG | HEIGHT: 70 IN | BODY MASS INDEX: 17.65 KG/M2 | TEMPERATURE: 97.2 F

## 2020-09-21 PROCEDURE — 99214 OFFICE O/P EST MOD 30 MIN: CPT | Performed by: NURSE PRACTITIONER

## 2020-09-21 PROCEDURE — G8427 DOCREV CUR MEDS BY ELIG CLIN: HCPCS | Performed by: NURSE PRACTITIONER

## 2020-09-21 PROCEDURE — 90686 IIV4 VACC NO PRSV 0.5 ML IM: CPT | Performed by: NURSE PRACTITIONER

## 2020-09-21 PROCEDURE — G8419 CALC BMI OUT NRM PARAM NOF/U: HCPCS | Performed by: NURSE PRACTITIONER

## 2020-09-21 PROCEDURE — 1036F TOBACCO NON-USER: CPT | Performed by: NURSE PRACTITIONER

## 2020-09-21 PROCEDURE — G0008 ADMIN INFLUENZA VIRUS VAC: HCPCS | Performed by: NURSE PRACTITIONER

## 2020-09-21 RX ORDER — POLYETHYLENE GLYCOL 3350 17 G/17G
17 POWDER, FOR SOLUTION ORAL EVERY OTHER DAY
COMMUNITY
End: 2020-11-23

## 2020-09-21 SDOH — HEALTH STABILITY: MENTAL HEALTH
STRESS IS WHEN SOMEONE FEELS TENSE, NERVOUS, ANXIOUS, OR CAN'T SLEEP AT NIGHT BECAUSE THEIR MIND IS TROUBLED. HOW STRESSED ARE YOU?: NOT AT ALL

## 2020-09-21 SDOH — HEALTH STABILITY: PHYSICAL HEALTH: ON AVERAGE, HOW MANY DAYS PER WEEK DO YOU ENGAGE IN MODERATE TO STRENUOUS EXERCISE (LIKE A BRISK WALK)?: 0 DAYS

## 2020-09-21 SDOH — HEALTH STABILITY: MENTAL HEALTH: HOW OFTEN DO YOU HAVE A DRINK CONTAINING ALCOHOL?: MONTHLY OR LESS

## 2020-09-21 SDOH — SOCIAL STABILITY: SOCIAL NETWORK: HOW OFTEN DO YOU ATTENT MEETINGS OF THE CLUB OR ORGANIZATION YOU BELONG TO?: NEVER

## 2020-09-21 SDOH — SOCIAL STABILITY: SOCIAL INSECURITY: WITHIN THE LAST YEAR, HAVE YOU BEEN AFRAID OF YOUR PARTNER OR EX-PARTNER?: NO

## 2020-09-21 SDOH — SOCIAL STABILITY: SOCIAL NETWORK: HOW OFTEN DO YOU GET TOGETHER WITH FRIENDS OR RELATIVES?: ONCE A WEEK

## 2020-09-21 SDOH — SOCIAL STABILITY: SOCIAL NETWORK
DO YOU BELONG TO ANY CLUBS OR ORGANIZATIONS SUCH AS CHURCH GROUPS UNIONS, FRATERNAL OR ATHLETIC GROUPS, OR SCHOOL GROUPS?: NO

## 2020-09-21 SDOH — SOCIAL STABILITY: SOCIAL NETWORK: IN A TYPICAL WEEK, HOW MANY TIMES DO YOU TALK ON THE PHONE WITH FAMILY, FRIENDS, OR NEIGHBORS?: ONCE A WEEK

## 2020-09-21 SDOH — SOCIAL STABILITY: SOCIAL INSECURITY
WITHIN THE LAST YEAR, HAVE TO BEEN RAPED OR FORCED TO HAVE ANY KIND OF SEXUAL ACTIVITY BY YOUR PARTNER OR EX-PARTNER?: NO

## 2020-09-21 SDOH — HEALTH STABILITY: MENTAL HEALTH: HOW MANY STANDARD DRINKS CONTAINING ALCOHOL DO YOU HAVE ON A TYPICAL DAY?: 1 OR 2

## 2020-09-21 SDOH — SOCIAL STABILITY: SOCIAL INSECURITY: WITHIN THE LAST YEAR, HAVE YOU BEEN HUMILIATED OR EMOTIONALLY ABUSED IN OTHER WAYS BY YOUR PARTNER OR EX-PARTNER?: NO

## 2020-09-21 SDOH — ECONOMIC STABILITY: FOOD INSECURITY: WITHIN THE PAST 12 MONTHS, THE FOOD YOU BOUGHT JUST DIDN'T LAST AND YOU DIDN'T HAVE MONEY TO GET MORE.: NEVER TRUE

## 2020-09-21 SDOH — ECONOMIC STABILITY: TRANSPORTATION INSECURITY
IN THE PAST 12 MONTHS, HAS LACK OF TRANSPORTATION KEPT YOU FROM MEETINGS, WORK, OR FROM GETTING THINGS NEEDED FOR DAILY LIVING?: NO

## 2020-09-21 SDOH — ECONOMIC STABILITY: INCOME INSECURITY: HOW HARD IS IT FOR YOU TO PAY FOR THE VERY BASICS LIKE FOOD, HOUSING, MEDICAL CARE, AND HEATING?: NOT HARD AT ALL

## 2020-09-21 SDOH — ECONOMIC STABILITY: TRANSPORTATION INSECURITY
IN THE PAST 12 MONTHS, HAS THE LACK OF TRANSPORTATION KEPT YOU FROM MEDICAL APPOINTMENTS OR FROM GETTING MEDICATIONS?: NO

## 2020-09-21 SDOH — SOCIAL STABILITY: SOCIAL INSECURITY
WITHIN THE LAST YEAR, HAVE YOU BEEN KICKED, HIT, SLAPPED, OR OTHERWISE PHYSICALLY HURT BY YOUR PARTNER OR EX-PARTNER?: NO

## 2020-09-21 SDOH — HEALTH STABILITY: PHYSICAL HEALTH: ON AVERAGE, HOW MANY MINUTES DO YOU ENGAGE IN EXERCISE AT THIS LEVEL?: 0 MIN

## 2020-09-21 SDOH — SOCIAL STABILITY: SOCIAL NETWORK: HOW OFTEN DO YOU ATTEND CHURCH OR RELIGIOUS SERVICES?: NEVER

## 2020-09-21 SDOH — SOCIAL STABILITY: SOCIAL NETWORK: ARE YOU MARRIED, WIDOWED, DIVORCED, SEPARATED, NEVER MARRIED, OR LIVING WITH A PARTNER?: NEVER MARRIED

## 2020-09-21 SDOH — ECONOMIC STABILITY: FOOD INSECURITY: WITHIN THE PAST 12 MONTHS, YOU WORRIED THAT YOUR FOOD WOULD RUN OUT BEFORE YOU GOT MONEY TO BUY MORE.: NEVER TRUE

## 2020-09-21 ASSESSMENT — ENCOUNTER SYMPTOMS
CONSTIPATION: 0
SHORTNESS OF BREATH: 0
ROS SKIN COMMENTS: RECURRENT COLD SORES
COUGH: 0
DIARRHEA: 0
RHINORRHEA: 0
SORE THROAT: 0

## 2020-09-21 ASSESSMENT — PATIENT HEALTH QUESTIONNAIRE - PHQ9
SUM OF ALL RESPONSES TO PHQ QUESTIONS 1-9: 0
2. FEELING DOWN, DEPRESSED OR HOPELESS: 0
1. LITTLE INTEREST OR PLEASURE IN DOING THINGS: 0
SUM OF ALL RESPONSES TO PHQ9 QUESTIONS 1 & 2: 0
SUM OF ALL RESPONSES TO PHQ QUESTIONS 1-9: 0

## 2020-09-21 NOTE — PROGRESS NOTES
[address] [facility]   [address]  [phone]    9/21/2020     Patient ID: Daphney Leyva is a 35 y.o. male. CHIEF COMPLAINT:     Daphney Leyva presents today for his medical conditions/complaints as noted below. Kaminski Other is c/o of Hyperlipidemia (NTP- Follow Up ) and Incontinence  . REVIEWED:      [x] Past Medical, Family, and Social History was reviewed per writer and does contribute to the patient presenting condition.     [x] Laboratory Results, Vital signs, Imaging, Active Problems, Immunizations, Current/Recently Discontinued Medications, Health Maintenance Activities Due, Referral Notes (if available) were reviewed per writer     [x] Reviewed Depression screening if taken or valid today or any other valid screening tool (others seen below) Interpretation of Total Score DepressionSeverity: 1-4 = Minimal depression, 5-9 = Mild depression, 10-14 = Moderate depression, 15-19 = Moderately severe depression, 20-27 =Severe depression    PHQ Scores 9/21/2020 5/1/2018 4/17/2017   PHQ2 Score 0 0 0   PHQ9 Score 0 0 0       Interpretation of Total Score DepressionSeverity: 1-4 = Minimal depression, 5-9 = Mild depression, 10-14 = Moderate depression, 15-19 = Moderately severe depression, 20-27 = Severe depression    Allergies   Allergen Reactions    Provigil [Modafinil] Other (See Comments)     Felt like bugs crawling on him     Current Outpatient Medications   Medication Sig Dispense Refill    polyethylene glycol (GLYCOLAX) 17 GM/SCOOP powder Take 17 g by mouth every other day      atorvastatin (LIPITOR) 20 MG tablet Take 1 tablet by mouth daily 31 tablet 5    folic acid (FOLVITE) 1 MG tablet Take 1 tablet by mouth daily 31 tablet 5    Cholecalciferol (VITAMIN D) 50 MCG (2000 UT) TABS tablet Take 1 tablet by mouth daily 31 tablet 5    Incontinence Supply Disposable (DEPEND GUARDS FOR MEN) MISC USE TWICE A DAY AND AS NEEDED FOR INCONTINENT EPISODES ([facility] [id number])**DELIVER TO [place] [address]* 100 each 11    levETIRAcetam (KEPPRA) 250 MG tablet Take 1 tablet by mouth 2 times daily - Every 12 hours 60 tablet 11    Incontinence Supply Disposable (DRI-SORB UNDERPAD) MISC USE TWICE A DAY &PRN FOR INCONTINENT EPISODES ([facility] [phone])**DELIVER TO [place] [address]** 90 Bottle 5    Incontinence Supply Disposable (ATTENDS UNDERWEAR 7 MEDIUM) MISC USE TWICE A DAY AND AS NEEDED FOR INCONTINENT EPISODES ([facility] [phone])**DELIVER TO OFFICE  [place] [address]* 180 Bottle 5     No current facility-administered medications for this visit. Past Medical History:   Diagnosis Date    Adjustment disorder     Dementia ([facility] [address]     Dystonia     Encephalopathy     Encephalopathy, unspecified     Esophageal reflux     Hypercholesteremia     Hyperlipidemia     Localization-related (focal) (partial) epilepsy and epileptic syndromes with simple partial seizures, without mention of intractable epilepsy     Neuropathy     Neuropathy     Persistent disorder of initiating or maintaining wakefulness     Unspecified epilepsy with intractable epilepsy       Past Surgical History:   Procedure Laterality Date    CRANIECTOMY  2004    CRANIOPLASTY  9/15/2004    EYE SURGERY Right     stye removal    OTHER SURGICAL HISTORY  2004    ICP Monitor     Family History   Problem Relation Age of Onset    Cancer Maternal Grandmother     High Cholesterol Maternal Grandmother     Other Maternal Grandmother         COPD    Coronary Art Dis Maternal Grandfather     Cancer Other         breast    Diabetes Other     Heart Disease Other     Hypertension Other     Migraines Other      Social History     Tobacco Use    Smoking status: Former Smoker     Packs/day: 0.50     Last attempt to quit: 2012     Years since quittin.7    Smokeless tobacco: Never Used    Tobacco comment: Quit in     Substance Use Topics    Alcohol use:  Yes Controlled release medication, and the dosage may be too much for him if it is crushed. We discussed discontinuing at this time, and monitoring his brief change, and how wet he is at night to see if this make a big differnence. My suggestion if it needs to be crushed to be administred we move to a lower dosage or different med that is more toleralbe if we need this. We did review patients labs, and do not a slight in crease his liver enzymes as discussed this may be due in part to his medications. As patient only tolerates certain foods    All other labs are stable, patients blood pressure is well, no other issues at this time. now lives in his own apartment - staffed with [address] [place] [address]:     /72 (Site: Right Upper Arm, Position: Sitting, Cuff Size: Medium Adult)   Pulse 82   Temp 97.2 °F (36.2 °C) (Temporal)   Resp 17   Ht 5' 10\" (1.778 m)   Wt 123 lb 4.8 oz (55.9 kg)   SpO2 92%   BMI 17.69 kg/m²    Physical Exam  Vitals signs reviewed. Constitutional:       Appearance: Normal appearance. He is not ill-appearing. HENT:      Head: Normocephalic and atraumatic. Eyes:      Extraocular Movements: Extraocular movements intact. Pupils: Pupils are equal, round, and reactive to light. Neck:      Musculoskeletal: Normal range of motion and neck supple. Vascular: No carotid bruit. Cardiovascular:      Rate and Rhythm: Normal rate and regular rhythm. Pulses: Normal pulses. Heart sounds: Normal heart sounds. Pulmonary:      Effort: Pulmonary effort is normal.      Breath sounds: Normal breath sounds. Abdominal:      General: Abdomen is flat. There is no distension. Palpations: Abdomen is soft. There is no mass. Tenderness: There is no abdominal tenderness. There is no right CVA tenderness or left CVA tenderness. Musculoskeletal: Normal range of motion. General: No swelling or tenderness. Skin:     General: Skin is warm. Capillary Refill: Capillary refill takes less than 2 seconds. Findings: No erythema or rash. Neurological:      Mental Status: He is alert. Mental status is at baseline. Sensory: Sensory deficit present. Motor: Weakness present. Coordination: Coordination abnormal.      Gait: Gait abnormal.   Psychiatric:         Mood and Affect: Mood normal.         Speech: He is noncommunicative (non - verbal ). Behavior: Behavior is uncooperative. Cognition and Memory: Cognition is impaired. Memory is impaired. ASSESSMENT /PLAN     1. Needs flu shot  completed  - INFLUENZA, QUADV, 3 YRS AND OLDER, IM PF, PREFILL SYR OR SDV, 0.5ML (AFLURIA QUADV, PF)    2. Pure hypercholesterolemia  -healthy diet as discussed       3. Other urinary incontinence  Hold toviaz    4. Liver function abnormality  Continue to monitor    5. Traumatic brain injury with loss of consciousness, subsequent encounter  Stable, Medicated, Monitored       No follow-ups on file. COMMUNICATION:       The best way to find yourself is to lose yourself in the service of others - [address] [facility]   [email or web address]  Office: [phone]   Cell: [phone]    Electronically signed by OLIVA Kelley on 9/27/2020 at 11:18 PM

## 2020-09-21 NOTE — PROGRESS NOTES
Vaccine Information Sheet, \"Influenza - Inactivated\"  given to Brady Pitts, or parent/legal guardian of  Brady Pitts and verbalized understanding. Patient responses:    Have you ever had a reaction to a flu vaccine? No  Do you have any current illness? No  Have you ever had [place] Syndrome? No  Do you have a serious allergy to any of the following: Neomycin, Polymyxin, Thimerosal, eggs or egg products? No    Flu vaccine given per order. Please see immunization tab. Risks and benefits explained. Current VIS given.

## 2020-10-07 RX ORDER — CHOLECALCIFEROL (VITAMIN D3) 50 MCG
2000 TABLET ORAL DAILY
Qty: 30 TABLET | Refills: 5 | Status: SHIPPED | OUTPATIENT
Start: 2020-10-07 | End: 2021-03-10

## 2020-10-07 NOTE — TELEPHONE ENCOUNTER
LV 9/21/20  LRF 4/6/20  RTO 11/3/20    Health Maintenance   Topic Date Due    Annual Wellness Visit (AWV)  05/29/2019    Lipid screen  08/25/2021    DTaP/Tdap/Td vaccine (2 - Td) 04/25/2024    Flu vaccine  Completed    HIV screen  Completed    Hepatitis A vaccine  Aged Out    Hepatitis B vaccine  Aged Out    Hib vaccine  Aged Out    Meningococcal (ACWY) vaccine  Aged Out    Pneumococcal 0-64 years Vaccine  Aged Out             (applicable per patient's age: Cancer Screenings, Depression Screening, Fall Risk Screening, Immunizations)    LDL Cholesterol (mg/dL)   Date Value   08/25/2020 82     LDL Calculated (mg/dL)   Date Value   11/29/2019 86     AST (U/L)   Date Value   08/25/2020 44 (H)     ALT (U/L)   Date Value   08/25/2020 73 (H)     BUN (mg/dL)   Date Value   08/25/2020 12      (goal A1C is < 7)   (goal LDL is <100) need 30-50% reduction from baseline     BP Readings from Last 3 Encounters:   09/21/20 112/72   02/25/20 100/68   10/24/19 99/66    (goal /80)      All Future Testing planned in [facility]:  Lab Frequency Next Occurrence   COVID-19 Ambulatory Once 08/13/2020   Comprehensive Metabolic Panel, Fasting Once 03/27/2021       Next Visit Date:  Future Appointments   Date Time Provider Cele Xiong   11/3/2020  4:40 PM Felicia Burt MD Neuro Spec [address]            Patient Active Problem List:     TBI (traumatic brain injury) [facility])     Hyperlipidemia     Intractable epilepsy ([facility] [address]     Narcolepsy     Urinary incontinence     Post traumatic encephalopathy     Constipation

## 2020-10-30 ENCOUNTER — TELEPHONE (OUTPATIENT)
Dept: FAMILY MEDICINE CLINIC | Age: 40
End: 2020-10-30

## 2020-10-30 NOTE — TELEPHONE ENCOUNTER
Patient mother called in and states she needs BRIEFS not UNDERPADS to [address] in [place]. Patient is completely out of briefs. Pended.

## 2020-11-02 RX ORDER — DIAPER,BRIEF,ADULT, DISPOSABLE
EACH MISCELLANEOUS
Qty: 1 PACKAGE | Refills: 0 | Status: CANCELLED | OUTPATIENT
Start: 2020-11-02

## 2020-11-02 NOTE — TELEPHONE ENCOUNTER
Patents mother called stating she has been having a back and forth issue with us and the pharmacy, patient is stating she needs prevail briefs for patient. Please check dispense and refill quantity. Patients mother states the last order was not signed and had no refill.

## 2020-11-03 ENCOUNTER — OFFICE VISIT (OUTPATIENT)
Dept: NEUROLOGY | Age: 40
End: 2020-11-03
Payer: MEDICARE

## 2020-11-03 VITALS
BODY MASS INDEX: 17.5 KG/M2 | TEMPERATURE: 97.1 F | HEIGHT: 71 IN | HEART RATE: 69 BPM | SYSTOLIC BLOOD PRESSURE: 115 MMHG | DIASTOLIC BLOOD PRESSURE: 74 MMHG | WEIGHT: 125 LBS

## 2020-11-03 PROCEDURE — G8419 CALC BMI OUT NRM PARAM NOF/U: HCPCS | Performed by: PSYCHIATRY & NEUROLOGY

## 2020-11-03 PROCEDURE — G8482 FLU IMMUNIZE ORDER/ADMIN: HCPCS | Performed by: PSYCHIATRY & NEUROLOGY

## 2020-11-03 PROCEDURE — 1036F TOBACCO NON-USER: CPT | Performed by: PSYCHIATRY & NEUROLOGY

## 2020-11-03 PROCEDURE — 99214 OFFICE O/P EST MOD 30 MIN: CPT | Performed by: PSYCHIATRY & NEUROLOGY

## 2020-11-03 PROCEDURE — G8427 DOCREV CUR MEDS BY ELIG CLIN: HCPCS | Performed by: PSYCHIATRY & NEUROLOGY

## 2020-11-03 RX ORDER — LEVETIRACETAM 250 MG/1
TABLET ORAL
Qty: 60 TABLET | Refills: 11 | Status: SHIPPED | OUTPATIENT
Start: 2020-11-03 | End: 2021-11-08

## 2020-11-03 RX ORDER — DIAPER,BRIEF,ADULT, DISPOSABLE
1 EACH MISCELLANEOUS 4 TIMES DAILY
Qty: 6 PACKAGE | Refills: 5 | Status: SHIPPED | OUTPATIENT
Start: 2020-11-03 | End: 2021-02-23 | Stop reason: SDUPTHER

## 2020-11-03 ASSESSMENT — ENCOUNTER SYMPTOMS
EYES NEGATIVE: 1
RESPIRATORY NEGATIVE: 1
GASTROINTESTINAL NEGATIVE: 1

## 2020-11-03 NOTE — PROGRESS NOTES
Subjective:      Patient ID: Delisa Ramos is a 35 y.o. male. 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 Patient with baseline cognitive dysfunction , unsteady gait along with seizure disorder being on keppra . 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 The condition is he remains in his own appartment with [facility] 24 hours staff spending time at home two days a week . 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 His mother reports good mood being stubborn with no agitation or behavioral disturbance. He is on keppra with no seizures . There is occasional paranoia or delusions . There is mild daytime sleepiness  . He remains wheelchair bound with baseline ataxia. Neck dystonia is gone . Significant medications keppra 250 mg po bid. He has been on provigil, neurontin, lyrica, cymbalta , seroquel, risperdal , elavil , atarax , zyprexa , depakote and requip in the past . Testing Head CT with encephalomalacia left frontal parietal and right parietal occipital with bilateral frontal craniotomies , February 2007. LTME no seizure activity seen, Polysomnogram apnea hypopnea index 6 episodes per hour. MSLT mean sleep latency 9.5 minutes with 2 out of 4 REM onset periods. MRI of Head with left frontal and temporal along with posterior right temporal and right inferior cerebellar encephalomalacia , June 2014 .  Depakote level 84, May 2015      Past Medical History:   Diagnosis Date    Adjustment disorder     Dementia ([facility] [address]     Dystonia     Encephalopathy     Encephalopathy, unspecified     Esophageal reflux     Hypercholesteremia     Hyperlipidemia     Localization-related (focal) (partial) epilepsy and epileptic syndromes with simple partial seizures, without mention of intractable epilepsy     Neuropathy     Neuropathy     Persistent disorder of initiating or maintaining wakefulness     Unspecified epilepsy with intractable epilepsy        Past Surgical History:   Procedure Laterality Date    CRANIECTOMY  2004    CRANIOPLASTY  9/15/2004    EYE SURGERY Right     stye removal    OTHER SURGICAL HISTORY  2004    ICP Monitor       Family History   Problem Relation Age of Onset    Cancer Maternal Grandmother     High Cholesterol Maternal Grandmother     Other Maternal Grandmother         COPD    Coronary Art Dis Maternal Grandfather     Cancer Other         breast    Diabetes Other     Heart Disease Other     Hypertension Other     Migraines Other        Social History     Socioeconomic History    Marital status: Single     Spouse name: None    Number of children: None    Years of education: None    Highest education level: None   Occupational History    None   Social Needs    Financial resource strain: Not hard at all   [place] insecurity     Worry: Never true     Inability: Never true    Transportation needs     Medical: No     Non-medical: No   Tobacco Use    Smoking status: Former Smoker     Packs/day: 0.50     Last attempt to quit: 2012     Years since quittin.8    Smokeless tobacco: Never Used    Tobacco comment: Quit in     Substance and Sexual Activity    Alcohol use:  Yes     Alcohol/week: 0.0 standard drinks     Frequency: Monthly or less     Drinks per session: 1 or 2     Binge frequency: Never     Comment: occasionally    Drug use: No     Types: Marijuana     Comment: past use only    Sexual activity: Not Currently     Comment: past use   Lifestyle    Physical activity     Days per week: 0 days     Minutes per session: 0 min    Stress: Not at all   Relationships    Social connections     Talks on phone: Once a week     Gets together: Once a week Attends Jewish service: Never     Active member of club or organization: No     Attends meetings of clubs or organizations: Never     Relationship status: Never     Intimate partner violence     Fear of current or ex partner: No     Emotionally abused: No     Physically abused: No     Forced sexual activity: No   Other Topics Concern    None   Social History Narrative    None       Current Outpatient Medications   Medication Sig Dispense Refill    Incontinence Supply Disposable (PREVAIL SUPER PLUS SM/MED) MISC 1 each by Does not apply route 4 times daily 6 Package 5    levETIRAcetam (KEPPRA) 250 MG tablet Take 1 tablet by mouth 2 times daily - Every 12 hours 60 tablet 11    folic acid (FOLVITE) 1 MG tablet Take 1 tablet by mouth daily 31 tablet 5    Incontinence Supply Disposable (ENTRUST PLUS UNDERPADS 23\"X36\") MISC 1 each by Does not apply route as needed (as needed) 1 Bottle 24    vitamin D (CHOLECALCIFEROL) 50 MCG (2000 UT) TABS tablet Take 1 tablet by mouth daily 30 tablet 5    polyethylene glycol (GLYCOLAX) 17 GM/SCOOP powder Take 17 g by mouth every other day      atorvastatin (LIPITOR) 20 MG tablet Take 1 tablet by mouth daily 31 tablet 5    Incontinence Supply Disposable (DEPEND GUARDS FOR MEN) MISC USE TWICE A DAY AND AS NEEDED FOR INCONTINENT EPISODES ([facility]# [id number])**DELIVER TO OFFICE [address] [place] [address]* 100 each 11    Incontinence Supply Disposable (DRI-SORB UNDERPAD) MISC USE TWICE A DAY &PRN FOR INCONTINENT EPISODES ([facility]# [id number])**DELIVER TO OFFICE [address] [place] [address]** 90 Bottle 5    Incontinence Supply Disposable (ATTENDS UNDERWEAR 7 MEDIUM) MISC USE TWICE A DAY AND AS NEEDED FOR INCONTINENT EPISODES ([facility]# [phone])**DELIVER TO OFFICE [address] [facility] [place] [address]* 180 Bottle 5     No current facility-administered medications for this visit.         Allergies   Allergen Reactions    Provigil [Modafinil] Other (See Comments)     Felt like bugs crawling on him           Review of Systems   Constitutional: Positive for fatigue. HENT: Negative. Eyes: Negative. Respiratory: Negative. Cardiovascular: Negative. Gastrointestinal: Negative. Endocrine: Negative. Genitourinary: Positive for frequency and urgency. Musculoskeletal: Positive for gait problem. Skin: Negative. Neurological: Positive for tremors, speech difficulty and weakness. Psychiatric/Behavioral: Negative. Objective:   Physical Exam  Vitals:    11/03/20 1653   BP: 115/74   Pulse: 69   Temp: 97.1 °F (36.2 °C)       Neurological Examination  Constitutional .General exam well groomed   Ears /Nose/Throat: external ear . Normal exam  Neck and thyroid . Normal size  Respiratory . Breathsounds clear bilaterally  Cardiovascular: Auscultation of heart with regular rate and rhythm   Musculoskeletal. Neck dystonia                                                               Muscle strength lifting all limbs equally                                                                                 Dysmetria all limbs  Head tremor   Decreased fine motor  Ataxic gait    Orientation Alert not oriented   Attention and concentration reduced  Short term memory reduced  Language process and speech  Simple verbal response  Cranial nerve 2 normal acuety and visual fields  Cranial nerve 3, 4 and 6 . Extraocular muscles are intact . Pupils are equal and reactive   Cranial nerve 5 . Normal strength of masseter and temporalis . Intact corneal reflex. Normal facial sensation  Cranial nerve 7 normal exam   Cranial nerve 8. Grossly intact hearing   Cranial nerve 9 and 10. Symmetric palate elevation   Cranial nerve 11 , 5 out of 5 strength   Cranial Nerve 12 midline tongue . No atrophy  Sensation . Normal proprioception . Intact Vibration . Normal pinprick and light touch   Deep Tendon Reflexes brisk   Plantar response extensor bilaterally    Assessment:      1. Post traumatic encephalopathy    2.

## 2020-11-23 RX ORDER — POLYETHYLENE GLYCOL 3350 17 G/17G
17 POWDER, FOR SOLUTION ORAL ONCE
Qty: 510 G | Refills: 0 | Status: SHIPPED | OUTPATIENT
Start: 2020-11-23 | End: 2020-11-23

## 2020-11-23 NOTE — TELEPHONE ENCOUNTER
Last visit: 9/21/2020  Last Med refill: historical  Does patient have enough medication for 72 hours: No:     Next Visit Date:  Future Appointments   Date Time Provider Cele Lovelacei   11/1/2021  4:40 PM Laurita Mcintosh MD Neuro Spec [address] Maintenance   Topic Date Due    Annual Wellness Visit (AWV)  05/29/2019    Lipid screen  08/25/2021    DTaP/Tdap/Td vaccine (2 - Td) 04/25/2024    Flu vaccine  Completed    HIV screen  Completed    Hepatitis A vaccine  Aged Out    Hepatitis B vaccine  Aged Out    Hib vaccine  Aged Out    Meningococcal (ACWY) vaccine  Aged Out    Pneumococcal 0-64 years Vaccine  Aged Out       No results found for: LABA1C          ( goal A1C is < 7)   No results found for: LABMICR  LDL Cholesterol (mg/dL)   Date Value   08/25/2020 82   02/05/2019 79     LDL Calculated (mg/dL)   Date Value   11/29/2019 86   04/19/2016 104       (goal LDL is <100)   AST (U/L)   Date Value   08/25/2020 44 (H)     ALT (U/L)   Date Value   08/25/2020 73 (H)     BUN (mg/dL)   Date Value   08/25/2020 12     BP Readings from Last 3 Encounters:   11/03/20 115/74   09/21/20 112/72   02/25/20 100/68          (goal 120/80)    All Future Testing planned in [facility]  Lab Frequency Next Occurrence   COVID-19 Ambulatory Once 08/13/2020   Comprehensive Metabolic Panel, Fasting Once 03/27/2021               Patient Active Problem List:     TBI (traumatic brain injury) ([facility] [address]     Hyperlipidemia     Intractable epilepsy ([facility] [address]     Narcolepsy     Urinary incontinence     Post traumatic encephalopathy     Constipation

## 2021-03-05 ENCOUNTER — TELEPHONE (OUTPATIENT)
Dept: NEUROLOGY | Age: 41
End: 2021-03-05

## 2021-03-05 NOTE — TELEPHONE ENCOUNTER
Kamini Batista called asking for a new order for a handicap parking placard. The order was generated, signed by Dr. Hernán Corley and mailed to the home address.

## 2021-03-10 DIAGNOSIS — E55.9 VITAMIN D DEFICIENCY: ICD-10-CM

## 2021-03-12 RX ORDER — CHOLECALCIFEROL (VITAMIN D3) 125 MCG
1 CAPSULE ORAL DAILY
Qty: 30 TABLET | Refills: 0 | Status: SHIPPED | OUTPATIENT
Start: 2021-03-12 | End: 2021-05-07

## 2021-03-15 DIAGNOSIS — E78.00 PURE HYPERCHOLESTEROLEMIA: ICD-10-CM

## 2021-03-15 RX ORDER — ATORVASTATIN CALCIUM 20 MG/1
20 TABLET, FILM COATED ORAL DAILY
Qty: 30 TABLET | Refills: 5 | Status: SHIPPED | OUTPATIENT
Start: 2021-03-15 | End: 2021-09-16

## 2021-03-16 NOTE — TELEPHONE ENCOUNTER
Lov: 9/21/20  Lrf: 10/26/20  Na: 0    Health Maintenance   Topic Date Due    Hepatitis C screen  Never done    COVID-19 Vaccine (1) Never done   [employer] Visit (AWV)  Never done    Lipid screen  08/25/2021    DTaP/Tdap/Td vaccine (2 - Td) 04/25/2024    Flu vaccine  Completed    HIV screen  Completed    Hepatitis A vaccine  Aged Out    Hepatitis B vaccine  Aged Out    Hib vaccine  Aged Out    Meningococcal (ACWY) vaccine  Aged Out    Pneumococcal 0-64 years Vaccine  Aged Out             (applicable per patient's age: Cancer Screenings, Depression Screening, Fall Risk Screening, Immunizations)    LDL Cholesterol (mg/dL)   Date Value   08/25/2020 82     LDL Calculated (mg/dL)   Date Value   11/29/2019 86     AST (U/L)   Date Value   08/25/2020 44 (H)     ALT (U/L)   Date Value   08/25/2020 73 (H)     BUN (mg/dL)   Date Value   08/25/2020 12      (goal A1C is < 7)   (goal LDL is <100) need 30-50% reduction from baseline     BP Readings from Last 3 Encounters:   11/03/20 115/74   09/21/20 112/72   02/25/20 100/68    (goal /80)      All Future Testing planned in [facility]:  Lab Frequency Next Occurrence   COVID-19 Ambulatory Once 08/13/2021   Comprehensive Metabolic Panel, Fasting Once 03/27/2021       Next Visit Date:  Future Appointments   Date Time Provider Cele Xiong   11/1/2021  4:40 PM Didier Waite MD Neuro Spec [facility]            Patient Active Problem List:     TBI (traumatic brain injury) [facility])     Hyperlipidemia     Intractable epilepsy ([facility] [address]     Narcolepsy     Urinary incontinence     Post traumatic encephalopathy     Constipation

## 2021-03-17 RX ORDER — FOLIC ACID 1 MG/1
1 TABLET ORAL DAILY
Qty: 30 TABLET | Refills: 0 | Status: SHIPPED | OUTPATIENT
Start: 2021-03-17 | End: 2021-04-22

## 2021-04-22 RX ORDER — FOLIC ACID 1 MG/1
TABLET ORAL
Qty: 31 TABLET | Refills: 0 | Status: SHIPPED | OUTPATIENT
Start: 2021-04-22 | End: 2021-06-01

## 2021-04-22 NOTE — TELEPHONE ENCOUNTER
Last visit: 9/21/20  Last Med refill: 3/17/21  Next Visit Date:  Future Appointments   Date Time Provider Cele Xiong   11/1/2021  4:40 PM Johnna Morin MD Neuro Spec [address] Maintenance   Topic Date Due    Hepatitis C screen  Never done    COVID-19 Vaccine (1) Never done    Annual Wellness Visit (AWV)  Never done    Lipid screen  08/25/2021    DTaP/Tdap/Td vaccine (2 - Td) 04/25/2024    Flu vaccine  Completed    HIV screen  Completed    Hepatitis A vaccine  Aged Out    Hepatitis B vaccine  Aged Out    Hib vaccine  Aged Out    Meningococcal (ACWY) vaccine  Aged Out    Pneumococcal 0-64 years Vaccine  Aged Out       No results found for: LABA1C          ( goal A1C is < 7)   No results found for: LABMICR  LDL Cholesterol (mg/dL)   Date Value   08/25/2020 82   02/05/2019 79     LDL Calculated (mg/dL)   Date Value   11/29/2019 86   04/19/2016 104       (goal LDL is <100)   AST (U/L)   Date Value   08/25/2020 44 (H)     ALT (U/L)   Date Value   08/25/2020 73 (H)     BUN (mg/dL)   Date Value   08/25/2020 12     BP Readings from Last 3 Encounters:   11/03/20 115/74   09/21/20 112/72   02/25/20 100/68          (goal 120/80)    All Future Testing planned in [facility]  Lab Frequency Next Occurrence   COVID-19 Ambulatory Once 08/13/2021   Comprehensive Metabolic Panel, Fasting Once 09/27/2021               Patient Active Problem List:     TBI (traumatic brain injury) ([facility] [address]     Hyperlipidemia     Intractable epilepsy ([facility] [address]     Narcolepsy     Urinary incontinence     Post traumatic encephalopathy     Constipation

## 2021-04-28 ENCOUNTER — TELEPHONE (OUTPATIENT)
Dept: NEUROLOGY | Age: 41
End: 2021-04-28

## 2021-04-28 NOTE — TELEPHONE ENCOUNTER
Patient's mother Thania Fitch called the office this morning. Mother states that they never received the letter from our office for the driving placard. I explained that this had been mailed 3/5/21 and offered to mail another copy. Mother stated that she would like to pick this up from the office. Mother Thania Fitch or sister Constanza Piña will pick this up.

## 2021-05-07 DIAGNOSIS — E55.9 VITAMIN D DEFICIENCY: ICD-10-CM

## 2021-05-07 NOTE — TELEPHONE ENCOUNTER
LOV: 09-    LRF: 03-    Health Maintenance   Topic Date Due    Hepatitis C screen  Never done    COVID-19 Vaccine (1) Never done   [employer] Visit (AWV)  Never done    Lipid screen  08/25/2021    DTaP/Tdap/Td vaccine (2 - Td) 04/25/2024    Flu vaccine  Completed    HIV screen  Completed    Hepatitis A vaccine  Aged Out    Hepatitis B vaccine  Aged Out    Hib vaccine  Aged Out    Meningococcal (ACWY) vaccine  Aged Out    Pneumococcal 0-64 years Vaccine  Aged Out             (applicable per patient's age: Cancer Screenings, Depression Screening, Fall Risk Screening, Immunizations)    LDL Cholesterol (mg/dL)   Date Value   08/25/2020 82     LDL Calculated (mg/dL)   Date Value   11/29/2019 86     AST (U/L)   Date Value   08/25/2020 44 (H)     ALT (U/L)   Date Value   08/25/2020 73 (H)     BUN (mg/dL)   Date Value   08/25/2020 12      (goal A1C is < 7)   (goal LDL is <100) need 30-50% reduction from baseline     BP Readings from Last 3 Encounters:   11/03/20 115/74   09/21/20 112/72   02/25/20 100/68    (goal /80)      All Future Testing planned in [facility]:  Lab Frequency Next Occurrence   COVID-19 Ambulatory Once 08/13/2021   Comprehensive Metabolic Panel, Fasting Once 09/27/2021       Next Visit Date:  Future Appointments   Date Time Provider Cele Xiong   5/24/2021  9:30 AM RICARDO Barfield - CNP [facility]   11/1/2021  4:40 PM Kashif Hearn MD Neuro Spec [facility]            Patient Active Problem List:     TBI (traumatic brain injury) [facility])     Hyperlipidemia     Intractable epilepsy ([facility] [address]     Narcolepsy     Urinary incontinence     Post traumatic encephalopathy     Constipation

## 2021-05-10 NOTE — TELEPHONE ENCOUNTER
LOV 9/21/20  LRF 11/23/21  RTO 6 months    Health Maintenance   Topic Date Due    Hepatitis C screen  Never done    COVID-19 Vaccine (1) Never done   [employer] Visit (AWV)  Never done    Lipid screen  08/25/2021    DTaP/Tdap/Td vaccine (2 - Td) 04/25/2024    Flu vaccine  Completed    HIV screen  Completed    Hepatitis A vaccine  Aged Out    Hepatitis B vaccine  Aged Out    Hib vaccine  Aged Out    Meningococcal (ACWY) vaccine  Aged Out    Pneumococcal 0-64 years Vaccine  Aged Out             (applicable per patient's age: Cancer Screenings, Depression Screening, Fall Risk Screening, Immunizations)    LDL Cholesterol (mg/dL)   Date Value   08/25/2020 82     LDL Calculated (mg/dL)   Date Value   11/29/2019 86     AST (U/L)   Date Value   08/25/2020 44 (H)     ALT (U/L)   Date Value   08/25/2020 73 (H)     BUN (mg/dL)   Date Value   08/25/2020 12      (goal A1C is < 7)   (goal LDL is <100) need 30-50% reduction from baseline     BP Readings from Last 3 Encounters:   11/03/20 115/74   09/21/20 112/72   02/25/20 100/68    (goal /80)      All Future Testing planned in [facility]:  Lab Frequency Next Occurrence   COVID-19 Ambulatory Once 08/13/2021   Comprehensive Metabolic Panel, Fasting Once 09/27/2021       Next Visit Date:  Future Appointments   Date Time Provider Cele Xiong   5/24/2021  9:30 AM Lyndel Klinefelter, APRN - CNP [facility]   11/1/2021  4:40 PM Zuly Cheng MD Neuro Spec [facility]            Patient Active Problem List:     TBI (traumatic brain injury) [facility])     Hyperlipidemia     Intractable epilepsy ([facility] [address]     Narcolepsy     Urinary incontinence     Post traumatic encephalopathy     Constipation

## 2021-05-11 RX ORDER — CHOLECALCIFEROL (VITAMIN D3) 125 MCG
CAPSULE ORAL
Qty: 30 TABLET | Refills: 0 | Status: SHIPPED | OUTPATIENT
Start: 2021-05-11 | End: 2021-06-01

## 2021-05-11 RX ORDER — POLYETHYLENE GLYCOL 3350 17 G/17G
POWDER, FOR SOLUTION ORAL
Qty: 510 G | Refills: 0 | Status: SHIPPED | OUTPATIENT
Start: 2021-05-11 | End: 2021-10-25

## 2021-05-24 ENCOUNTER — HOSPITAL ENCOUNTER (OUTPATIENT)
Age: 41
Setting detail: SPECIMEN
Discharge: HOME | End: 2021-05-24
Payer: MEDICARE

## 2021-05-24 ENCOUNTER — OFFICE VISIT (OUTPATIENT)
Dept: FAMILY MEDICINE CLINIC | Age: 41
End: 2021-05-24
Payer: MEDICARE

## 2021-05-24 VITALS
WEIGHT: 122 LBS | OXYGEN SATURATION: 97 % | TEMPERATURE: 97.5 F | BODY MASS INDEX: 17.02 KG/M2 | DIASTOLIC BLOOD PRESSURE: 62 MMHG | HEART RATE: 61 BPM | SYSTOLIC BLOOD PRESSURE: 100 MMHG

## 2021-05-24 DIAGNOSIS — R94.5 LIVER FUNCTION ABNORMALITY: ICD-10-CM

## 2021-05-24 DIAGNOSIS — G40.909 SEIZURE DISORDER (HCC): ICD-10-CM

## 2021-05-24 DIAGNOSIS — N39.498 OTHER URINARY INCONTINENCE: Primary | ICD-10-CM

## 2021-05-24 LAB
ALBUMIN SERPL-MCNC: 4.3 G/DL (ref 3.5–5.2)
ALBUMIN/GLOBULIN RATIO: 1.5 (ref 1–2.5)
ALP BLD-CCNC: 70 U/L (ref 40–129)
ALT SERPL-CCNC: 30 U/L (ref 5–41)
ANION GAP SERPL CALCULATED.3IONS-SCNC: 14 MMOL/L (ref 9–17)
AST SERPL-CCNC: 24 U/L
BILIRUB SERPL-MCNC: 0.41 MG/DL (ref 0.3–1.2)
BUN BLDV-MCNC: 10 MG/DL (ref 6–20)
BUN/CREAT BLD: NORMAL (ref 9–20)
CALCIUM SERPL-MCNC: 8.9 MG/DL (ref 8.6–10.4)
CHLORIDE BLD-SCNC: 103 MMOL/L (ref 98–107)
CO2: 26 MMOL/L (ref 20–31)
CREAT SERPL-MCNC: 0.71 MG/DL (ref 0.7–1.2)
GFR AFRICAN AMERICAN: >60 ML/MIN
GFR NON-AFRICAN AMERICAN: >60 ML/MIN
GFR SERPL CREATININE-BSD FRML MDRD: NORMAL ML/MIN/{1.73_M2}
GFR SERPL CREATININE-BSD FRML MDRD: NORMAL ML/MIN/{1.73_M2}
GLUCOSE BLD-MCNC: 78 MG/DL (ref 70–99)
KEPPRA: 5 UG/ML
POTASSIUM SERPL-SCNC: 4.4 MMOL/L (ref 3.7–5.3)
SODIUM BLD-SCNC: 143 MMOL/L (ref 135–144)
TOTAL PROTEIN: 7.2 G/DL (ref 6.4–8.3)

## 2021-05-24 PROCEDURE — G8419 CALC BMI OUT NRM PARAM NOF/U: HCPCS | Performed by: NURSE PRACTITIONER

## 2021-05-24 PROCEDURE — 1036F TOBACCO NON-USER: CPT | Performed by: NURSE PRACTITIONER

## 2021-05-24 PROCEDURE — 99214 OFFICE O/P EST MOD 30 MIN: CPT | Performed by: NURSE PRACTITIONER

## 2021-05-24 PROCEDURE — G8427 DOCREV CUR MEDS BY ELIG CLIN: HCPCS | Performed by: NURSE PRACTITIONER

## 2021-05-24 ASSESSMENT — ENCOUNTER SYMPTOMS
SORE THROAT: 0
DIARRHEA: 0
COUGH: 0
RHINORRHEA: 0
CONSTIPATION: 0
SHORTNESS OF BREATH: 0

## 2021-05-24 ASSESSMENT — PATIENT HEALTH QUESTIONNAIRE - PHQ9: SUM OF ALL RESPONSES TO PHQ QUESTIONS 1-9: 0

## 2021-05-24 NOTE — PROGRESS NOTES
[address] [facility]   [address]   [phone]    2021     CHIEF COMPLAINT:     Mariana Pacheco (:  1980) is a 38 y.o. male, here for evaluation of the following chief complaint(s):  Incontinence      REVIEWED INFORMATION      Allergies   Allergen Reactions    Provigil [Modafinil] Other (See Comments)     Felt like bugs crawling on him       Current Outpatient Medications   Medication Sig Dispense Refill    oxybutynin (OXYTROL) 3.9 MG/24HR Replace patch every  PM and Wednesday PM . 8 patch 2    Cholecalciferol (VITAMIN D3) 50 MCG (2000) TABS TAKE 1 TAB BY MOUTH ONCE EVERY DAY 30 tablet 0    polyethylene glycol (GLYCOLAX) 17 GM/SCOOP powder TAKE 1/2 CAPFUL MIXED WITH 8 OZ. LIQUID ON , TUESDAY, THURSDAY MORNINGS.  MAY HOLD DOSE IF HAS DIARRHEA (NOTIFY NURSE) 225 g 0    folic acid (FOLVITE) 1 MG tablet TAKE 1 TAB BY MOUTH ONCE EVERY DAY 31 tablet 0    atorvastatin (LIPITOR) 20 MG tablet Take 1 tablet by mouth daily 30 tablet 5    Incontinence Supply Disposable (PREVAIL SUPER PLUS SM/MED) MISC 1 each by Does not apply route 4 times daily 6 Package 5    levETIRAcetam (KEPPRA) 250 MG tablet Take 1 tablet by mouth 2 times daily - Every 12 hours 60 tablet 11    Incontinence Supply Disposable (ENTRUST PLUS UNDERPADS 23\"X36\") MISC 1 each by Does not apply route as needed (as needed) (Patient not taking: Reported on 2021) 1 Bottle 24    Incontinence Supply Disposable (DEPEND GUARDS FOR MEN) MISC USE TWICE A DAY AND AS NEEDED FOR INCONTINENT EPISODES ([facility]# [id number])**DELIVER TO OFFICE [address] [place], [place] [address]* (Patient not taking: Reported on 2021) 100 each 11    Incontinence Supply Disposable (DRI-SORB UNDERPAD) MISC USE TWICE A DAY &PRN FOR INCONTINENT EPISODES ([facility]# [id number])**DELIVER TO OFFICE [address] [place], [place] [address]** (Patient not taking: Reported on 2021) 90 Bottle 5    Incontinence Supply Disposable (ATTENDS UNDERWEAR 7 MEDIUM) MISC USE TWICE A DAY AND AS NEEDED FOR INCONTINENT EPISODES ([facility]# [id number])**DELIVER TO OFFICE [address] [place] [address]. [address] [facility], [address]* (Patient not taking: Reported on 5/24/2021) 180 Bottle 5     No current facility-administered medications for this visit. Patient Care Team:  RICARDO Jang CNP as PCP - General (Nurse Practitioner)  RICARDO Jang CNP as PCP - [facility] Empaneled Provider    REVIEW OF SYSTEMS:     Review of Systems   Constitutional: Negative for activity change, fatigue and unexpected weight change. HENT: Negative for congestion, ear pain, hearing loss, rhinorrhea and sore throat. Respiratory: Negative for cough and shortness of breath. Cardiovascular: Negative for chest pain, palpitations and leg swelling. Gastrointestinal: Negative for constipation and diarrhea. Genitourinary: Positive for difficulty urinating. Musculoskeletal: Negative for arthralgias and gait problem. Neurological: Positive for weakness. Negative for dizziness and headaches. Psychiatric/Behavioral: Negative for confusion. The patient is not nervous/anxious. HISTORY OF PRESENT ILLNESS     Patient returns today with mother presents. Here to discuss increasing in brief saturation. Patient has a history of TBI, which has led to significant issues regarding incontinence. When I first met patient patient was placed on Toviaz 8 mg. However patient has a difficult time with taking oral medications. And his Devonda Hesselbach was being crushed in his applesauce. Concern I had with patient is at that time is that he was getting too much of Devonda Hesselbach which was causing bladder retention as patient was unable to void appropriately. And then would desaturate during the night. Patient was stopped on Toviaz and given a rest.  Initially he did see some improvement however recently noted that he has been having an increase in saturated brace during the day.   Up to 4 of them in an 8-hour period with motion and neck supple. Skin:     General: Skin is warm. Capillary Refill: Capillary refill takes less than 2 seconds. Findings: No erythema or rash. Neurological:      Mental Status: He is alert. Mental status is at baseline. Sensory: Sensory deficit present. Motor: Weakness present. Coordination: Coordination abnormal.      Gait: Gait abnormal.   Psychiatric:         Mood and Affect: Mood normal.         Speech: He is noncommunicative (non - verbal ). Behavior: Behavior is uncooperative. Cognition and Memory: Cognition is impaired. Memory is impaired. PROCEDURE/ IN OFFICE TESTING     No in office testing or procedures completed during today's office visit. ASSESSMENT/PLAN/ FOLLOWUP:     1. Other urinary incontinence  -     oxybutynin (OXYTROL) 3.9 MG/24HR; Replace patch every Sunday PM and Wednesday PM ., Disp-8 patch, R-2Normal  -     AFL - Shahida Berry MD, Urology, [place]  2. Seizure disorder [facility])  -     Charlene Lopez MD, Urology, [place]  -     Levetiracetam Level; Future  -     Comprehensive Metabolic Panel; Future  3. Liver function abnormality  -     Levetiracetam Level; Future  -     Comprehensive Metabolic Panel; Future      Return in about 6 months (around 11/24/2021) for Medicare Wellness Visit, symptom check and lab review. COMMUNICATION:           The best way to find yourself is to lose yourself in the service of others - [address] [place] [address]. [address] [facility]   [email or web address]  Office: [phone]     An electronic signature was used to authenticate this note.   Signed by RICARDO Mejia CNP, APRN-CNP on 5/24/2021 at 10:24 AM

## 2021-05-29 DIAGNOSIS — E55.9 VITAMIN D DEFICIENCY: ICD-10-CM

## 2021-05-29 DIAGNOSIS — R94.5 LIVER FUNCTION ABNORMALITY: Primary | ICD-10-CM

## 2021-06-01 RX ORDER — FOLIC ACID 1 MG/1
1 TABLET ORAL DAILY
Qty: 30 TABLET | Refills: 5 | Status: SHIPPED | OUTPATIENT
Start: 2021-06-01 | End: 2021-12-09

## 2021-06-01 RX ORDER — CHOLECALCIFEROL (VITAMIN D3) 125 MCG
1 CAPSULE ORAL DAILY
Qty: 30 TABLET | Refills: 5 | Status: SHIPPED | OUTPATIENT
Start: 2021-06-01 | End: 2021-11-08

## 2021-06-01 NOTE — TELEPHONE ENCOUNTER
LOV 4/24/21  LRF Vit D 5/85/39,  Folic Acid 0/01/91  RTO Scheduled    Health Maintenance   Topic Date Due    Annual Wellness Visit (AWV)  Never done    Lipid screen  08/25/2021    DTaP/Tdap/Td vaccine (2 - Td) 04/25/2024    Flu vaccine  Completed    COVID-19 Vaccine  Completed    HIV screen  Completed    Hepatitis A vaccine  Aged Out    Hepatitis B vaccine  Aged Out    Hib vaccine  Aged Out    Meningococcal (ACWY) vaccine  Aged Out    Pneumococcal 0-64 years Vaccine  Aged Out    Hepatitis C screen  Discontinued             (applicable per patient's age: Cancer Screenings, Depression Screening, Fall Risk Screening, Immunizations)    LDL Cholesterol (mg/dL)   Date Value   08/25/2020 82     LDL Calculated (mg/dL)   Date Value   11/29/2019 86     AST (U/L)   Date Value   05/24/2021 24     ALT (U/L)   Date Value   05/24/2021 30     BUN (mg/dL)   Date Value   05/24/2021 10      (goal A1C is < 7)   (goal LDL is <100) need 30-50% reduction from baseline     BP Readings from Last 3 Encounters:   05/24/21 100/62   11/03/20 115/74   09/21/20 112/72    (goal /80)      All Future Testing planned in [facility]:  Lab Frequency Next Occurrence   COVID-19 Ambulatory Once 08/13/2021   Comprehensive Metabolic Panel, Fasting Once 09/27/2021       Next Visit Date:  Future Appointments   Date Time Provider Cele Xiong   11/1/2021  4:40 PM Chico Jonas MD Neuro Spec [facility]            Patient Active Problem List:     TBI (traumatic brain injury) [facility])     Hyperlipidemia     Intractable epilepsy ([facility] [address]     Narcolepsy     Urinary incontinence     Post traumatic encephalopathy     Constipation

## 2021-06-14 ENCOUNTER — PATIENT MESSAGE (OUTPATIENT)
Dept: FAMILY MEDICINE CLINIC | Age: 41
End: 2021-06-14

## 2021-06-14 DIAGNOSIS — N39.498 OTHER URINARY INCONTINENCE: Primary | ICD-10-CM

## 2021-06-14 RX ORDER — DIAPER,BRIEF,ADULT, DISPOSABLE
EACH MISCELLANEOUS
Qty: 48 EACH | Refills: 5 | Status: SHIPPED | OUTPATIENT
Start: 2021-06-14 | End: 2022-04-25

## 2021-06-14 NOTE — TELEPHONE ENCOUNTER
Last visit: 5/24/21  Last Med refill: 11/15/2019    Next Visit Date:  Future Appointments   Date Time Provider Cele Xiong   11/1/2021  4:40 PM Chin Eli MD Neuro Spec [address] Maintenance   Topic Date Due    Annual Wellness Visit (AWV)  Never done    Lipid screen  08/25/2021    DTaP/Tdap/Td vaccine (2 - Td or Tdap) 04/25/2024    Flu vaccine  Completed    COVID-19 Vaccine  Completed    HIV screen  Completed    Hepatitis A vaccine  Aged Out    Hepatitis B vaccine  Aged Out    Hib vaccine  Aged Out    Meningococcal (ACWY) vaccine  Aged Out    Pneumococcal 0-64 years Vaccine  Aged Out    Hepatitis C screen  Discontinued       No results found for: LABA1C          ( goal A1C is < 7)   No results found for: LABMICR  LDL Cholesterol (mg/dL)   Date Value   08/25/2020 82   02/05/2019 79     LDL Calculated (mg/dL)   Date Value   11/29/2019 86   04/19/2016 104       (goal LDL is <100)   AST (U/L)   Date Value   05/24/2021 24     ALT (U/L)   Date Value   05/24/2021 30     BUN (mg/dL)   Date Value   05/24/2021 10     BP Readings from Last 3 Encounters:   05/24/21 100/62   11/03/20 115/74   09/21/20 112/72          (goal 120/80)    All Future Testing planned in [facility]  Lab Frequency Next Occurrence   COVID-19 Ambulatory Once 08/13/2021   Comprehensive Metabolic Panel, Fasting Once 09/27/2021               Patient Active Problem List:     TBI (traumatic brain injury) ([facility] [address]     Hyperlipidemia     Intractable epilepsy ([facility] [address]     Narcolepsy     Urinary incontinence     Post traumatic encephalopathy     Constipation

## 2021-06-14 NOTE — TELEPHONE ENCOUNTER
From: Vesna Sanchez  To: Rafia Ramirez, RICARDO - CNP  Sent: 6/14/2021 2:17 PM EDT  Subject: Prescription Question    Christopher Brennan denied the [address] for Juliuson as it is not formulary. Christopher Brennan does not have a record that he had tried at least 2 covered drugs. Christopher Brennan indicated that they need medical records showing that other covered drugs were tried, and why they were not successful. The Tovaiz 4/ 8 mg was listed as a covered drug, but if you recall the Toviaz can't be crushed and Paul often refuses to take pills (thinks were trying to poison him, or just stubborn). I believe years ago before [facility] Dr. Esdras Levine tried another med for incontinence (think it was a generic medication) I don't recall why he switched to Melvenia Hora . If have left the denial/appeal form at the desk for your review. If you have any questions feel free to call me [phone].

## 2021-06-14 NOTE — TELEPHONE ENCOUNTER
IsoPlexis,  Patient has been tried on [place] extended relief, Ditropan overall, and Sanctura. We need to adjust his prior authorization to see if they will allow this medication.

## 2021-08-02 ENCOUNTER — TELEPHONE (OUTPATIENT)
Dept: FAMILY MEDICINE CLINIC | Age: 41
End: 2021-08-02

## 2021-08-03 ENCOUNTER — OFFICE VISIT (OUTPATIENT)
Dept: FAMILY MEDICINE CLINIC | Age: 41
End: 2021-08-03
Payer: MEDICARE

## 2021-08-03 VITALS
DIASTOLIC BLOOD PRESSURE: 64 MMHG | WEIGHT: 119 LBS | BODY MASS INDEX: 16.6 KG/M2 | TEMPERATURE: 97 F | SYSTOLIC BLOOD PRESSURE: 104 MMHG | OXYGEN SATURATION: 99 % | HEART RATE: 78 BPM

## 2021-08-03 DIAGNOSIS — T50.905A ADVERSE EFFECT OF DRUG, INITIAL ENCOUNTER: ICD-10-CM

## 2021-08-03 DIAGNOSIS — R22.0 SWELLING OF FACE: Primary | ICD-10-CM

## 2021-08-03 PROCEDURE — 99214 OFFICE O/P EST MOD 30 MIN: CPT | Performed by: NURSE PRACTITIONER

## 2021-08-03 PROCEDURE — 96372 THER/PROPH/DIAG INJ SC/IM: CPT | Performed by: NURSE PRACTITIONER

## 2021-08-03 PROCEDURE — G8427 DOCREV CUR MEDS BY ELIG CLIN: HCPCS | Performed by: NURSE PRACTITIONER

## 2021-08-03 PROCEDURE — 1036F TOBACCO NON-USER: CPT | Performed by: NURSE PRACTITIONER

## 2021-08-03 PROCEDURE — G8419 CALC BMI OUT NRM PARAM NOF/U: HCPCS | Performed by: NURSE PRACTITIONER

## 2021-08-03 RX ORDER — PREDNISONE 5 MG/ML
20 SOLUTION ORAL EVERY MORNING
Qty: 100 ML | Refills: 0 | Status: SHIPPED | OUTPATIENT
Start: 2021-08-04 | End: 2021-08-09

## 2021-08-03 RX ORDER — METHYLPREDNISOLONE ACETATE 40 MG/ML
60 INJECTION, SUSPENSION INTRA-ARTICULAR; INTRALESIONAL; INTRAMUSCULAR; SOFT TISSUE ONCE
Status: COMPLETED | OUTPATIENT
Start: 2021-08-03 | End: 2021-08-03

## 2021-08-03 RX ORDER — DIPHENHYDRAMINE HCL 12.5MG/5ML
25 LIQUID (ML) ORAL 4 TIMES DAILY PRN
Qty: 180 ML | Refills: 0 | Status: SHIPPED | OUTPATIENT
Start: 2021-08-03 | End: 2022-05-11

## 2021-08-03 RX ADMIN — METHYLPREDNISOLONE ACETATE 60 MG: 40 INJECTION, SUSPENSION INTRA-ARTICULAR; INTRALESIONAL; INTRAMUSCULAR; SOFT TISSUE at 12:37

## 2021-08-03 ASSESSMENT — ENCOUNTER SYMPTOMS
RHINORRHEA: 0
SHORTNESS OF BREATH: 0
CONSTIPATION: 0
DIARRHEA: 0
COUGH: 0
SORE THROAT: 0

## 2021-08-03 NOTE — PROGRESS NOTES
[address] [facility]   [address]   [phone]    8/3/2021     CHIEF COMPLAINT:     Susi Escalante (:  1980) is a 38 y.o. male, here for evaluation of the following chief complaint(s):  Medication Reaction (Oxytrol, Swelling started on  around the eyes)      REVIEWED INFORMATION      Allergies   Allergen Reactions    Provigil [Modafinil] Other (See Comments)     Felt like bugs crawling on him       Current Outpatient Medications   Medication Sig Dispense Refill    [START ON 2021] predniSONE 5 MG/5ML solution Take 20 mLs by mouth every morning for 5 days 100 mL 0    diphenhydrAMINE (BENADRYL) 12.5 MG/5ML elixir Take 10 mLs by mouth 4 times daily as needed for Itching or Allergies 180 mL 0    Incontinence Supply Disposable (DEPEND GUARDS FOR MEN) MISC USE AS DIRECTED TWICE A DAY 48 each 5    folic acid (FOLVITE) 1 MG tablet Take 1 tablet by mouth daily 30 tablet 5    Cholecalciferol (VITAMIN D3) 50 MCG (2000) TABS Take 1 tablet by mouth daily 30 tablet 5    polyethylene glycol (GLYCOLAX) 17 GM/SCOOP powder TAKE 1/2 CAPFUL MIXED WITH 8 OZ. LIQUID ON , TUESDAY, THURSDAY MORNINGS.  MAY HOLD DOSE IF HAS DIARRHEA (NOTIFY NURSE) 510 g 0    atorvastatin (LIPITOR) 20 MG tablet Take 1 tablet by mouth daily 30 tablet 5    Incontinence Supply Disposable (PREVAIL SUPER PLUS SM/MED) MISC 1 each by Does not apply route 4 times daily 6 Package 5    levETIRAcetam (KEPPRA) 250 MG tablet Take 1 tablet by mouth 2 times daily - Every 12 hours 60 tablet 11    Incontinence Supply Disposable (ENTRUST PLUS UNDERPADS 23\"X36\") MISC 1 each by Does not apply route as needed (as needed) 1 Bottle 24    Incontinence Supply Disposable (DRI-SORB UNDERPAD) MISC USE TWICE A DAY &PRN FOR INCONTINENT EPISODES ([facility]# [phone])**[place] [address]** 90 Bottle 5    Incontinence Supply Disposable (ATTENDS UNDERWEAR 7 MEDIUM) MISC USE TWICE A DAY AND AS NEEDED FOR INCONTINENT EPISODES ([facility]# [id number])**DELIVER TO OFFICE [address] [place], [place] [address]* 180 Bottle 5    oxybutynin (OXYTROL) 3.9 MG/24HR Replace patch every Sunday PM and Wednesday PM . (Patient not taking: Reported on 8/3/2021) 8 patch 2     No current facility-administered medications for this visit. Patient Care Team:  RICARDO Pacheco CNP as PCP - General (Nurse Practitioner)  RICARDO Pacheco CNP as PCP - REHABILITATION [facility] Empaneled Provider    REVIEW OF SYSTEMS:     Review of Systems   Constitutional: Negative for activity change, fatigue and unexpected weight change. HENT: Negative for congestion, ear pain, hearing loss, rhinorrhea and sore throat. Respiratory: Negative for cough and shortness of breath. Cardiovascular: Negative for chest pain, palpitations and leg swelling. Gastrointestinal: Negative for constipation and diarrhea. Musculoskeletal: Positive for gait problem (wheel chair bound). Negative for arthralgias. Skin:        Facial swelling and edema      Neurological: Negative for dizziness, weakness and headaches. Psychiatric/Behavioral: Positive for confusion (stable - ). The patient is not nervous/anxious. HISTORY OF PRESENT ILLNESS     Patient presents today for facial swelling. Patient was recently start on transdermal oxytrol patch for bladder incontinence. He has had 3 transdermal changes since started. Staff at his facility starting noting some swelling of the facial area on Friday of last week, which appears to have worsened over the weekend. Patient does not appear to be itching, no shortness of breath or swallowing changes. No other areas including site of placement affected. We sill discontinue patch today - given dosage of IM steroid and benadryl oral. With 20 mg dosage to titrate down dosage for 5 days starting tomorrow. Once swelling has stopped, discussed restarting the Toviaz as whole pill and not crushing.  As patient was absorbing too much when first scene as it was extended release . Patient mother will notify me if no improvement or worsening - Mallory Beltraner will be reordered at that time. PHYSICAL EXAM:     /64   Pulse 78   Temp 97 °F (36.1 °C) (Temporal)   Wt 119 lb (54 kg) Comment: with chair 162  SpO2 99%   BMI 16.60 kg/m²      Physical Exam  Constitutional:       Appearance: Normal appearance. He is well-developed. He is not ill-appearing. HENT:      Head:        Ears:      Comments: Facial swelling and erythema noted     Eyes:      General:         Right eye: No discharge. Left eye: No discharge. Extraocular Movements: Extraocular movements intact. Conjunctiva/sclera: Conjunctivae normal.   Neck:      Thyroid: No thyroid mass. Vascular: No JVD. Pulmonary:      Effort: Pulmonary effort is normal. No respiratory distress. Musculoskeletal:      Right shoulder: No deformity. Normal range of motion. Left shoulder: No deformity. Normal range of motion. Cervical back: Normal range of motion. Skin:     General: Skin is moist.      Coloration: Skin is not cyanotic or jaundiced. Findings: No rash. Neurological:      General: No focal deficit present. Mental Status: He is alert and oriented to person, place, and time. Gait: Gait is intact. Psychiatric:         Attention and Perception: Attention normal. He does not perceive auditory or visual hallucinations. Mood and Affect: Mood normal.         Speech: Speech normal.          PROCEDURE/ IN OFFICE TESTING/ LAB REVIEW     No in office testing or procedures completed during today's office visit. ASSESSMENT/PLAN/ FOLLOWUP:     PLEASE NOTE THAT ANY DISCONTINUATION OF MEDICATIONS OR MEDICAL SUPPLIES REFLECTED IN TODAY'S VISIT SUMMARY  MAY NOT HAVE COMPLETED AS A CHANGE IN YOUR PLAN OF CARE.  THESE CHANGES MAY HAVE ONLY BEEN DONE SO IN ORDER TO CLEAN UP LIST FROM DUPLICATIONS OR MISCELLANEOUS SUPPLIES ONLY

## 2021-09-14 ENCOUNTER — PATIENT MESSAGE (OUTPATIENT)
Dept: FAMILY MEDICINE CLINIC | Age: 41
End: 2021-09-14

## 2021-09-14 RX ORDER — FESOTERODINE FUMARATE 8 MG/1
8 TABLET, FILM COATED, EXTENDED RELEASE ORAL DAILY PRN
Qty: 30 TABLET | Refills: 5 | Status: SHIPPED | OUTPATIENT
Start: 2021-09-14 | End: 2022-04-07

## 2021-09-14 NOTE — TELEPHONE ENCOUNTER
From: Javier Nichols  To: Merrill Swartz, APRN - CNP  Sent: 9/14/2021 10:14 AM EDT  Subject: Non-Urgent Medical Question    Good morning/afternoon. As we discussed at Evelio's last appointment, could we revisit putting him back on Toviaz (8mg), make it PRN and when he takes it, he takes it! Andrew Gusman will need to place a note in his med book that is dated and signed on a script pad or letterhead stating that Marichuy Brooks may take this Eason Men as a PRN not necessary for daily dose if he refuses. His scripts are through [facility].

## 2021-09-16 DIAGNOSIS — E78.00 PURE HYPERCHOLESTEROLEMIA: ICD-10-CM

## 2021-09-16 RX ORDER — ATORVASTATIN CALCIUM 20 MG/1
20 TABLET, FILM COATED ORAL DAILY
Qty: 90 TABLET | Refills: 1 | Status: SHIPPED | OUTPATIENT
Start: 2021-09-16 | End: 2022-03-09

## 2021-09-16 NOTE — TELEPHONE ENCOUNTER
LOV 9/21/20  LRF 3/15/21  RTO 6 months,     Health Maintenance   Topic Date Due    Annual Wellness Visit (AWV)  Never done    Flu vaccine (1) 09/01/2021    Lipid screen  08/25/2021    DTaP/Tdap/Td vaccine (2 - Td or Tdap) 04/25/2024    COVID-19 Vaccine  Completed    HIV screen  Completed    Hepatitis A vaccine  Aged Out    Hepatitis B vaccine  Aged Out    Hib vaccine  Aged Out    Meningococcal (ACWY) vaccine  Aged Out    Pneumococcal 0-64 years Vaccine  Aged Out    Hepatitis C screen  Discontinued             (applicable per patient's age: Cancer Screenings, Depression Screening, Fall Risk Screening, Immunizations)    LDL Cholesterol (mg/dL)   Date Value   08/25/2020 82     LDL Calculated (mg/dL)   Date Value   11/29/2019 86     AST (U/L)   Date Value   05/24/2021 24     ALT (U/L)   Date Value   05/24/2021 30     BUN (mg/dL)   Date Value   05/24/2021 10      (goal A1C is < 7)   (goal LDL is <100) need 30-50% reduction from baseline     BP Readings from Last 3 Encounters:   08/03/21 104/64   05/24/21 100/62   11/03/20 115/74    (goal /80)      All Future Testing planned in [facility]:  Lab Frequency Next Occurrence   Comprehensive Metabolic Panel, Fasting Once 09/27/2021       Next Visit Date:  Future Appointments   Date Time Provider Cele Xiong   11/15/2021  4:20 PM Maynor Vaz MD Neuro Spec [address]            Patient Active Problem List:     TBI (traumatic brain injury) [facility])     Hyperlipidemia     Intractable epilepsy ([facility] [address]     Narcolepsy     Urinary incontinence     Post traumatic encephalopathy     Constipation

## 2021-09-20 ENCOUNTER — TELEPHONE (OUTPATIENT)
Dept: FAMILY MEDICINE CLINIC | Age: 41
End: 2021-09-20

## 2021-09-20 NOTE — TELEPHONE ENCOUNTER
Patient mother called into the office regarding the Seng Obi. It should be written AS NEEDED (PRN). I called and spoke with [address] and confirmed this should be PRN per the script. The ongoing issue is that when the pt does not need the medication  nad opts out of taking it for the day, as PRN medications should be, the home help he receives cannot document that it is a PRN medication because it was written as DAILY. In other words, it looks like the pt is refusing medication therapy when not taking, when he really is not refusing, he is just choosing not to take the PRN med that day.

## 2021-10-25 RX ORDER — POLYETHYLENE GLYCOL 3350 17 G/17G
POWDER, FOR SOLUTION ORAL
Qty: 510 G | Refills: 0 | Status: SHIPPED | OUTPATIENT
Start: 2021-10-25 | End: 2022-02-22

## 2021-10-25 NOTE — TELEPHONE ENCOUNTER
Lov: 5/24/21  Lrf: 5/11/21  Na: 0  Health Maintenance   Topic Date Due    Lipid screen  08/25/2021    Flu vaccine (1) 09/01/2021    DTaP/Tdap/Td vaccine (2 - Td or Tdap) 04/25/2024    COVID-19 Vaccine  Completed    HIV screen  Completed    Hepatitis A vaccine  Aged Out    Hepatitis B vaccine  Aged Out    Hib vaccine  Aged Out    Meningococcal (ACWY) vaccine  Aged Out    Pneumococcal 0-64 years Vaccine  Aged Out    Hepatitis C screen  Discontinued             (applicable per patient's age: Cancer Screenings, Depression Screening, Fall Risk Screening, Immunizations)    LDL Cholesterol (mg/dL)   Date Value   08/25/2020 82     LDL Calculated (mg/dL)   Date Value   11/29/2019 86     AST (U/L)   Date Value   05/24/2021 24     ALT (U/L)   Date Value   05/24/2021 30     BUN (mg/dL)   Date Value   05/24/2021 10      (goal A1C is < 7)   (goal LDL is <100) need 30-50% reduction from baseline     BP Readings from Last 3 Encounters:   08/03/21 104/64   05/24/21 100/62   11/03/20 115/74    (goal /80)      All Future Testing planned in [facility]:  Lab Frequency Next Occurrence       Next Visit Date:  Future Appointments   Date Time Provider Cele Xiong   11/15/2021  4:20 PM Kimberly Grover MD Neuro Spec [facility]            Patient Active Problem List:     TBI (traumatic brain injury) [facility])     Hyperlipidemia     Intractable epilepsy ([facility] [address]     Narcolepsy     Urinary incontinence     Post traumatic encephalopathy     Constipation

## 2021-11-05 DIAGNOSIS — G40.909 SEIZURE DISORDER (HCC): Primary | ICD-10-CM

## 2021-11-05 DIAGNOSIS — E55.9 VITAMIN D DEFICIENCY: ICD-10-CM

## 2021-11-08 RX ORDER — CHOLECALCIFEROL (VITAMIN D3) 125 MCG
CAPSULE ORAL
Qty: 30 TABLET | Refills: 0 | Status: SHIPPED | OUTPATIENT
Start: 2021-11-08 | End: 2021-12-09

## 2021-11-08 RX ORDER — LEVETIRACETAM 250 MG/1
TABLET ORAL
Qty: 60 TABLET | Refills: 11 | Status: SHIPPED | OUTPATIENT
Start: 2021-11-08

## 2021-11-08 NOTE — TELEPHONE ENCOUNTER
Pharmacy requesting a  refill of: Keppra 250mg     Medication active on med list yes     Date of last prescription: 11/03/2020  with 11  refills verified on 11/08/2021  verified by CHUY RAMIREZ     Date of last appointment:  11/03/2020     Next Visit Date:  11/15/2021

## 2021-11-15 ENCOUNTER — OFFICE VISIT (OUTPATIENT)
Dept: NEUROLOGY | Age: 41
End: 2021-11-15
Payer: MEDICARE

## 2021-11-15 VITALS
BODY MASS INDEX: 17.5 KG/M2 | HEART RATE: 76 BPM | DIASTOLIC BLOOD PRESSURE: 74 MMHG | WEIGHT: 125 LBS | SYSTOLIC BLOOD PRESSURE: 112 MMHG | HEIGHT: 71 IN

## 2021-11-15 DIAGNOSIS — F07.81 POST TRAUMATIC ENCEPHALOPATHY: Primary | ICD-10-CM

## 2021-11-15 DIAGNOSIS — G40.909 SEIZURE DISORDER (HCC): ICD-10-CM

## 2021-11-15 PROCEDURE — G8427 DOCREV CUR MEDS BY ELIG CLIN: HCPCS | Performed by: PSYCHIATRY & NEUROLOGY

## 2021-11-15 PROCEDURE — G8419 CALC BMI OUT NRM PARAM NOF/U: HCPCS | Performed by: PSYCHIATRY & NEUROLOGY

## 2021-11-15 PROCEDURE — G8484 FLU IMMUNIZE NO ADMIN: HCPCS | Performed by: PSYCHIATRY & NEUROLOGY

## 2021-11-15 PROCEDURE — 1036F TOBACCO NON-USER: CPT | Performed by: PSYCHIATRY & NEUROLOGY

## 2021-11-15 PROCEDURE — 99214 OFFICE O/P EST MOD 30 MIN: CPT | Performed by: PSYCHIATRY & NEUROLOGY

## 2021-11-15 ASSESSMENT — ENCOUNTER SYMPTOMS
GASTROINTESTINAL NEGATIVE: 1
EYES NEGATIVE: 1
RESPIRATORY NEGATIVE: 1

## 2021-11-15 NOTE — PROGRESS NOTES
epilepsy and epileptic syndromes with simple partial seizures, without mention of intractable epilepsy     Neuropathy     Neuropathy     Persistent disorder of initiating or maintaining wakefulness     Unspecified epilepsy with intractable epilepsy        Past Surgical History:   Procedure Laterality Date    CRANIECTOMY  2004    CRANIOPLASTY  9/15/2004    EYE SURGERY Right     stye removal    OTHER SURGICAL HISTORY  2004    ICP Monitor       Family History   Problem Relation Age of Onset    Cancer Maternal Grandmother     High Cholesterol Maternal Grandmother     Other Maternal Grandmother         COPD    Coronary Art Dis Maternal Grandfather     Cancer Other         breast    Diabetes Other     Heart Disease Other     Hypertension Other     Migraines Other        Social History     Socioeconomic History    Marital status: Single     Spouse name: None    Number of children: None    Years of education: None    Highest education level: None   Occupational History    None   Tobacco Use    Smoking status: Former Smoker     Packs/day: 0.50     Quit date: 2012     Years since quittin.8    Smokeless tobacco: Never Used    Tobacco comment: Quit in     Vaping Use    Vaping Use: Never used   Substance and Sexual Activity    Alcohol use: Yes     Alcohol/week: 0.0 standard drinks     Comment: occasionally    Drug use: No     Types: Marijuana (Weed)     Comment: past use only    Sexual activity: Not Currently     Comment: past use   Other Topics Concern    None   Social History Narrative    None     Social Determinants of Health     Financial Resource Strain:     Difficulty of Paying Living Expenses: Not on file   Food Insecurity:     Worried About Running Out of Food in the Last Year: Not on file    Tess of Food in the Last Year: Not on file   Transportation Needs:     Lack of Transportation (Medical): Not on file    Lack of Transportation (Non-Medical):  Not on file Physical Activity:     Days of Exercise per Week: Not on file    Minutes of Exercise per Session: Not on file   Stress:     Feeling of Stress : Not on file   Social Connections:     Frequency of Communication with Friends and Family: Not on file    Frequency of Social Gatherings with Friends and Family: Not on file    Attends [employer] Services: Not on file    Active Member of [address] [place] [address] or Organizations: Not on file    Attends Club or Organization Meetings: Not on file    Marital Status: Not on file   Intimate Partner Violence:     Fear of Current or Ex-Partner: Not on file    Emotionally Abused: Not on file    Physically Abused: Not on file    Sexually Abused: Not on file   Housing Stability:     Unable to Pay for Housing in the Last Year: Not on file    Number of [place] in the Last Year: Not on file    Unstable Housing in the Last Year: Not on file       Current Outpatient Medications   Medication Sig Dispense Refill    Cholecalciferol (VITAMIN D3) 50 MCG (2000 UT) TABS TAKE 1 TAB BY MOUTH ONCE EVERY DAY 30 tablet 0    levETIRAcetam (KEPPRA) 250 MG tablet TAKE 1 TAB BY MOUTH TWICE A DAY EVERY 12 HOURS 60 tablet 11    polyethylene glycol (GLYCOLAX) 17 GM/SCOOP powder TAKE 1/2 CAPFUL MIXED WITH 8 OZ. LIQUID ON SUNDAY, TUESDAY, THURSDAY MORNINGS.  MAY HOLD DOSE IF HAS DIARRHEA (NOTIFY NURSE) 510 g 0    atorvastatin (LIPITOR) 20 MG tablet Take 1 tablet by mouth daily 90 tablet 1    Fesoterodine Fumarate ER (TOVIAZ) 8 MG TB24 Take 8 mg by mouth daily as needed (as needed) 30 tablet 5    Incontinence Supply Disposable (DEPEND GUARDS FOR MEN) MISC USE AS DIRECTED TWICE A DAY 48 each 5    folic acid (FOLVITE) 1 MG tablet Take 1 tablet by mouth daily 30 tablet 5    Incontinence Supply Disposable (PREVAIL SUPER PLUS SM/MED) MISC 1 each by Does not apply route 4 times daily 6 Package 5    Incontinence Supply Disposable (ENTRUST PLUS UNDERPADS 23\"X36\") MISC 1 each by Does not apply route as needed (as needed) 1 Bottle 24    Incontinence Supply Disposable (DRI-SORB UNDERPAD) MISC USE TWICE A DAY &PRN FOR INCONTINENT EPISODES ([facility]# [phone])**DELIVER TO OFFICE [address] [place] [address]** 90 Bottle 5    Incontinence Supply Disposable (ATTENDS UNDERWEAR 7 MEDIUM) MISC USE TWICE A DAY AND AS NEEDED FOR INCONTINENT EPISODES ([facility]# [phone])**DELIVER TO OFFICE [address] [place] [address]* 180 Bottle 5    oxybutynin (OXYTROL) 3.9 MG/24HR Replace patch every Sunday PM and Wednesday PM . (Patient not taking: Reported on 8/3/2021) 8 patch 2     No current facility-administered medications for this visit. Allergies   Allergen Reactions    Provigil [Modafinil] Other (See Comments)     Felt like bugs crawling on him    Oxytrol [Oxybutynin] Swelling     Eye Swelling             Review of Systems   Constitutional: Positive for fatigue. HENT: Negative. Eyes: Negative. Respiratory: Negative. Cardiovascular: Negative. Gastrointestinal: Negative. Endocrine: Negative. Genitourinary: Positive for frequency and urgency. Musculoskeletal: Positive for gait problem. Skin: Negative. Neurological: Positive for tremors, speech difficulty and weakness. Psychiatric/Behavioral: Negative. Objective:   Physical Exam  Vitals:    11/15/21 1626   BP: 112/74   Pulse: 76       Neurological Examination  Constitutional .General exam well groomed   Ears /Nose/Throat: external ear . Normal exam  Neck and thyroid . Normal size  Respiratory . Breathsounds clear bilaterally  Cardiovascular:  Auscultation of heart with regular rate and rhythm   Musculoskeletal. Neck dystonia                                                               Muscle strength lifting all limbs equally                                                                                 Dysmetria all limbs  Head tremor   Decreased fine motor  Ataxic gait    Orientation Alert not oriented   Attention and concentration reduced  Short term memory reduced  Language process and speech  Simple verbal response  Cranial nerve 2 normal acuety and visual fields  Cranial nerve 3, 4 and 6 . Extraocular muscles are intact . Pupils are equal and reactive   Cranial nerve 5 . Normal strength of masseter and temporalis . Intact corneal reflex. Normal facial sensation  Cranial nerve 7 normal exam   Cranial nerve 8. Grossly intact hearing   Cranial nerve 9 and 10. Symmetric palate elevation   Cranial nerve 11 , 5 out of 5 strength   Cranial Nerve 12 midline tongue . No atrophy  Sensation . Normal proprioception . Intact Vibration . Normal pinprick and light touch   Deep Tendon Reflexes brisk   Plantar response extensor bilaterally    Assessment:      No diagnosis found. He is to continue current medication regimen       Plan:      As above

## 2021-12-09 DIAGNOSIS — E55.9 VITAMIN D DEFICIENCY: ICD-10-CM

## 2021-12-09 DIAGNOSIS — R94.5 LIVER FUNCTION ABNORMALITY: ICD-10-CM

## 2021-12-09 RX ORDER — CHOLECALCIFEROL (VITAMIN D3) 125 MCG
1 CAPSULE ORAL DAILY
Qty: 30 TABLET | Refills: 5 | Status: SHIPPED | OUTPATIENT
Start: 2021-12-09 | End: 2022-06-06

## 2021-12-09 RX ORDER — FOLIC ACID 1 MG/1
1 TABLET ORAL DAILY
Qty: 30 TABLET | Refills: 5 | Status: SHIPPED | OUTPATIENT
Start: 2021-12-09 | End: 2022-06-06

## 2022-01-27 ENCOUNTER — PATIENT MESSAGE (OUTPATIENT)
Dept: FAMILY MEDICINE CLINIC | Age: 42
End: 2022-01-27

## 2022-01-27 DIAGNOSIS — N39.498 OTHER URINARY INCONTINENCE: ICD-10-CM

## 2022-01-27 DIAGNOSIS — F07.81 POST TRAUMATIC ENCEPHALOPATHY: ICD-10-CM

## 2022-01-27 DIAGNOSIS — G40.909 SEIZURE DISORDER (HCC): Primary | ICD-10-CM

## 2022-01-27 NOTE — TELEPHONE ENCOUNTER
From: Lise Stern  To: Valerie Gill  Sent: 1/27/2022 12:57 PM EST  Subject: Medication Updates (remove and keep and refill needed)    Patricia Sanchez had several duplicate incontinence scripts and it was confusing for all. Refills are not happening correctly as there are to many to chose from. We would like to keep the following:  *Depend Guards for Men  *Entrust Plus Disposible Underpads (need refill now, request was previously sent to the office for refill. Please change the to size to 17x24 as the 23x36 are to large for his wheelchair seat and hang way below the seat.)  *Attends Underwear medium (7)    I removed the following  *Drisorb incontinence Brand  *Prevail underwear  *Drisorb Underpads  I also removed the Oxitrol as we discontinued this a while ago do to a possible side effect. All his scripts go through [employer]. Thank you!

## 2022-02-09 DIAGNOSIS — B00.1 COLD SORE: Primary | ICD-10-CM

## 2022-02-09 RX ORDER — ACYCLOVIR 200 MG/5ML
400 SUSPENSION ORAL EVERY 8 HOURS
Qty: 150 ML | Refills: 0 | Status: SHIPPED | OUTPATIENT
Start: 2022-02-09 | End: 2022-02-14

## 2022-02-22 RX ORDER — POLYETHYLENE GLYCOL 3350 17 G/17G
POWDER, FOR SOLUTION ORAL
Qty: 510 G | Refills: 0 | Status: SHIPPED | OUTPATIENT
Start: 2022-02-22 | End: 2022-03-03

## 2022-02-22 NOTE — TELEPHONE ENCOUNTER
Last visit: 08/03/21  Last Med refill: 10/25/21  Does patient have enough medication for 72 hours: Yes.     Next Visit Date:  Future Appointments   Date Time Provider Cele Xiong   11/15/2022  3:40 PM Laisha Hernandez MD Neuro Spec [address] Maintenance   Topic Date Due    COVID-19 Vaccine (3 - Booster for Moderna series) 08/03/2021    Lipid screen  08/25/2021    Flu vaccine (1) 09/01/2021    Depression Screen  05/24/2022    DTaP/Tdap/Td vaccine (2 - Td or Tdap) 04/25/2024    HIV screen  Completed    Hepatitis A vaccine  Aged Out    Hepatitis B vaccine  Aged Out    Hib vaccine  Aged Out    Meningococcal (ACWY) vaccine  Aged Out    Pneumococcal 0-64 years Vaccine  Aged Out    Hepatitis C screen  Discontinued       No results found for: LABA1C          ( goal A1C is < 7)   No results found for: LABMICR  LDL Cholesterol (mg/dL)   Date Value   08/25/2020 82   02/05/2019 79     LDL Calculated (mg/dL)   Date Value   11/29/2019 86   04/19/2016 104       (goal LDL is <100)   AST (U/L)   Date Value   05/24/2021 24     ALT (U/L)   Date Value   05/24/2021 30     BUN (mg/dL)   Date Value   05/24/2021 10     BP Readings from Last 3 Encounters:   11/15/21 112/74   08/03/21 104/64   05/24/21 100/62          (goal 120/80)    All Future Testing planned in [facility]  Lab Frequency Next Occurrence               Patient Active Problem List:     TBI (traumatic brain injury) ([facility] [address]     Hyperlipidemia     Intractable epilepsy ([facility] [address]     Narcolepsy     Urinary incontinence     Post traumatic encephalopathy     Constipation

## 2022-03-03 RX ORDER — POLYETHYLENE GLYCOL 3350 17 G/17G
POWDER, FOR SOLUTION ORAL
Qty: 510 G | Refills: 0 | Status: SHIPPED | OUTPATIENT
Start: 2022-03-03 | End: 2022-08-25 | Stop reason: SDUPTHER

## 2022-03-09 DIAGNOSIS — E78.00 PURE HYPERCHOLESTEROLEMIA: ICD-10-CM

## 2022-03-09 RX ORDER — ATORVASTATIN CALCIUM 20 MG/1
TABLET, FILM COATED ORAL
Qty: 30 TABLET | Refills: 0 | Status: SHIPPED | OUTPATIENT
Start: 2022-03-09 | End: 2022-04-08 | Stop reason: SDUPTHER

## 2022-03-09 NOTE — TELEPHONE ENCOUNTER
Last visit: 08/03/21  Last Med refill: 09/16/21  Does patient have enough medication for 72 hours: Yes    Next Visit Date:  Future Appointments   Date Time Provider Cele Inga   11/15/2022  3:40 PM Jennifer Santa MD Neuro Spec [address] Maintenance   Topic Date Due    COVID-19 Vaccine (3 - Booster for Moderna series) 08/03/2021    Lipid screen  08/25/2021    Flu vaccine (1) 09/01/2021    Depression Screen  05/24/2022    DTaP/Tdap/Td vaccine (2 - Td or Tdap) 04/25/2024    HIV screen  Completed    Hepatitis A vaccine  Aged Out    Hepatitis B vaccine  Aged Out    Hib vaccine  Aged Out    Meningococcal (ACWY) vaccine  Aged Out    Pneumococcal 0-64 years Vaccine  Aged Out    Hepatitis C screen  Discontinued       No results found for: LABA1C          ( goal A1C is < 7)   No results found for: LABMICR  LDL Cholesterol (mg/dL)   Date Value   08/25/2020 82   02/05/2019 79     LDL Calculated (mg/dL)   Date Value   11/29/2019 86   04/19/2016 104       (goal LDL is <100)   AST (U/L)   Date Value   05/24/2021 24     ALT (U/L)   Date Value   05/24/2021 30     BUN (mg/dL)   Date Value   05/24/2021 10     BP Readings from Last 3 Encounters:   11/15/21 112/74   08/03/21 104/64   05/24/21 100/62          (goal 120/80)    All Future Testing planned in [facility]  Lab Frequency Next Occurrence               Patient Active Problem List:     TBI (traumatic brain injury) ([facility] [address]     Hyperlipidemia     Intractable epilepsy ([facility] [address]     Narcolepsy     Urinary incontinence     Post traumatic encephalopathy     Constipation

## 2022-04-07 RX ORDER — FESOTERODINE FUMARATE 8 MG/1
TABLET, FILM COATED, EXTENDED RELEASE ORAL
Qty: 29 TABLET | Refills: 0 | Status: SHIPPED | OUTPATIENT
Start: 2022-04-07 | End: 2022-05-27

## 2022-04-07 NOTE — TELEPHONE ENCOUNTER
Last visit: 08/03/21  Last Med refill: 09/14/21  Does patient have enough medication for 72 hours: Yes    Next Visit Date:  Future Appointments   Date Time Provider Cele Inga   11/15/2022  3:40 PM Alen Rich MD Neuro Spec [address] Maintenance   Topic Date Due    COVID-19 Vaccine (3 - Booster for Moderna series) 08/03/2021    Lipid screen  08/25/2021    Depression Screen  05/24/2022    Flu vaccine (Season Ended) 09/01/2022    DTaP/Tdap/Td vaccine (2 - Td or Tdap) 04/25/2024    HIV screen  Completed    Hepatitis A vaccine  Aged Out    Hepatitis B vaccine  Aged Out    Hib vaccine  Aged Out    Meningococcal (ACWY) vaccine  Aged Out    Pneumococcal 0-64 years Vaccine  Aged Out    Hepatitis C screen  Discontinued       No results found for: LABA1C          ( goal A1C is < 7)   No results found for: LABMICR  LDL Cholesterol (mg/dL)   Date Value   08/25/2020 82   02/05/2019 79     LDL Calculated (mg/dL)   Date Value   11/29/2019 86   04/19/2016 104       (goal LDL is <100)   AST (U/L)   Date Value   05/24/2021 24     ALT (U/L)   Date Value   05/24/2021 30     BUN (mg/dL)   Date Value   05/24/2021 10     BP Readings from Last 3 Encounters:   11/15/21 112/74   08/03/21 104/64   05/24/21 100/62          (goal 120/80)    All Future Testing planned in [facility]  Lab Frequency Next Occurrence               Patient Active Problem List:     TBI (traumatic brain injury) ([facility] [address]     Hyperlipidemia     Intractable epilepsy ([facility] [address]     Narcolepsy     Urinary incontinence     Post traumatic encephalopathy     Constipation

## 2022-04-08 DIAGNOSIS — E78.00 PURE HYPERCHOLESTEROLEMIA: ICD-10-CM

## 2022-04-08 NOTE — TELEPHONE ENCOUNTER
LOV 5/24/21  LRF 3/9/22  RTO My chart sent to schedule    Health Maintenance   Topic Date Due    COVID-19 Vaccine (3 - Booster for Moderna series) 08/03/2021    Lipid screen  08/25/2021    Depression Screen  05/24/2022    Flu vaccine (Season Ended) 09/01/2022    DTaP/Tdap/Td vaccine (2 - Td or Tdap) 04/25/2024    HIV screen  Completed    Hepatitis A vaccine  Aged Out    Hepatitis B vaccine  Aged Out    Hib vaccine  Aged Out    Meningococcal (ACWY) vaccine  Aged Out    Pneumococcal 0-64 years Vaccine  Aged Out    Hepatitis C screen  Discontinued             (applicable per patient's age: Cancer Screenings, Depression Screening, Fall Risk Screening, Immunizations)    LDL Cholesterol (mg/dL)   Date Value   08/25/2020 82     LDL Calculated (mg/dL)   Date Value   11/29/2019 86     AST (U/L)   Date Value   05/24/2021 24     ALT (U/L)   Date Value   05/24/2021 30     BUN (mg/dL)   Date Value   05/24/2021 10      (goal A1C is < 7)   (goal LDL is <100) need 30-50% reduction from baseline     BP Readings from Last 3 Encounters:   11/15/21 112/74   08/03/21 104/64   05/24/21 100/62    (goal /80)      All Future Testing planned in [facility]:  Lab Frequency Next Occurrence       Next Visit Date:  Future Appointments   Date Time Provider Cele Xiong   11/15/2022  3:40 PM Harmony Chávez MD Neuro Spec [facility]            Patient Active Problem List:     TBI (traumatic brain injury) [facility])     Hyperlipidemia     Intractable epilepsy ([facility] [address]     Narcolepsy     Urinary incontinence     Post traumatic encephalopathy     Constipation

## 2022-04-11 RX ORDER — ATORVASTATIN CALCIUM 20 MG/1
20 TABLET, FILM COATED ORAL DAILY
Qty: 90 TABLET | Refills: 1 | Status: SHIPPED | OUTPATIENT
Start: 2022-04-11 | End: 2022-04-21

## 2022-04-12 DIAGNOSIS — E78.00 PURE HYPERCHOLESTEROLEMIA: ICD-10-CM

## 2022-04-12 RX ORDER — ATORVASTATIN CALCIUM 20 MG/1
20 TABLET, FILM COATED ORAL DAILY
Qty: 30 TABLET | Refills: 5 | OUTPATIENT
Start: 2022-04-12 | End: 2023-04-12

## 2022-04-20 DIAGNOSIS — E78.00 PURE HYPERCHOLESTEROLEMIA: ICD-10-CM

## 2022-04-21 RX ORDER — ATORVASTATIN CALCIUM 20 MG/1
TABLET, FILM COATED ORAL
Qty: 31 TABLET | Refills: 0 | Status: SHIPPED | OUTPATIENT
Start: 2022-04-21 | End: 2022-05-12

## 2022-04-21 NOTE — TELEPHONE ENCOUNTER
Last visit: 08/03/21  Last Med refill: 04/11/22  Does patient have enough medication for 72 hours: Yes    Next Visit Date:  Future Appointments   Date Time Provider Cele Xiong   4/25/2022 12:30 PM RICARDO Mcmahon - CNP [place]  [id number]   11/15/2022  3:40 PM Te Alanis MD Neuro Spec [address] Maintenance   Topic Date Due    COVID-19 Vaccine (3 - Booster for Moderna series) 08/03/2021    Lipids  08/25/2021    Depression Screen  05/24/2022    Flu vaccine (Season Ended) 09/01/2022    DTaP/Tdap/Td vaccine (2 - Td or Tdap) 04/25/2024    HIV screen  Completed    Hepatitis A vaccine  Aged Out    Hepatitis B vaccine  Aged Out    Hib vaccine  Aged Out    Meningococcal (ACWY) vaccine  Aged Out    Pneumococcal 0-64 years Vaccine  Aged Out    Hepatitis C screen  Discontinued       No results found for: LABA1C          ( goal A1C is < 7)   No results found for: LABMICR  LDL Cholesterol (mg/dL)   Date Value   08/25/2020 82   02/05/2019 79     LDL Calculated (mg/dL)   Date Value   11/29/2019 86   04/19/2016 104       (goal LDL is <100)   AST (U/L)   Date Value   05/24/2021 24     ALT (U/L)   Date Value   05/24/2021 30     BUN (mg/dL)   Date Value   05/24/2021 10     BP Readings from Last 3 Encounters:   11/15/21 112/74   08/03/21 104/64   05/24/21 100/62          (goal 120/80)    All Future Testing planned in [facility]  Lab Frequency Next Occurrence               Patient Active Problem List:     TBI (traumatic brain injury) ([facility] [address]     Hyperlipidemia     Intractable epilepsy ([facility] [address]     Narcolepsy     Urinary incontinence     Post traumatic encephalopathy     Constipation

## 2022-04-25 ENCOUNTER — HOSPITAL ENCOUNTER (OUTPATIENT)
Age: 42
Setting detail: SPECIMEN
Discharge: HOME | End: 2022-04-25

## 2022-04-25 ENCOUNTER — TELEPHONE (OUTPATIENT)
Dept: FAMILY MEDICINE CLINIC | Age: 42
End: 2022-04-25

## 2022-04-25 ENCOUNTER — OFFICE VISIT (OUTPATIENT)
Dept: FAMILY MEDICINE CLINIC | Age: 42
End: 2022-04-25
Payer: MEDICARE

## 2022-04-25 VITALS
TEMPERATURE: 95.5 F | HEART RATE: 69 BPM | OXYGEN SATURATION: 95 % | WEIGHT: 119 LBS | DIASTOLIC BLOOD PRESSURE: 60 MMHG | BODY MASS INDEX: 16.6 KG/M2 | SYSTOLIC BLOOD PRESSURE: 98 MMHG

## 2022-04-25 DIAGNOSIS — M20.42 HAMMER TOE OF LEFT FOOT: ICD-10-CM

## 2022-04-25 DIAGNOSIS — Z01.818 PREOPERATIVE CLEARANCE: Primary | ICD-10-CM

## 2022-04-25 DIAGNOSIS — G40.909 SEIZURE DISORDER (HCC): ICD-10-CM

## 2022-04-25 DIAGNOSIS — B00.1 COLD SORE: ICD-10-CM

## 2022-04-25 LAB
ANION GAP SERPL CALCULATED.3IONS-SCNC: 11 MMOL/L (ref 9–17)
BILIRUBIN URINE: NEGATIVE
CHLORIDE BLD-SCNC: 106 MMOL/L (ref 98–107)
CO2: 28 MMOL/L (ref 20–31)
COLOR: YELLOW
COMMENT UA: NORMAL
GLUCOSE URINE: NEGATIVE
HCT VFR BLD CALC: 44.9 % (ref 40.7–50.3)
HEMOGLOBIN: 14.8 G/DL (ref 13–17)
KETONES, URINE: NEGATIVE
LEUKOCYTE ESTERASE, URINE: NEGATIVE
MCH RBC QN AUTO: 29.5 PG (ref 25.2–33.5)
MCHC RBC AUTO-ENTMCNC: 33 G/DL (ref 28.4–34.8)
MCV RBC AUTO: 89.6 FL (ref 82.6–102.9)
NITRITE, URINE: NEGATIVE
NRBC AUTOMATED: 0 PER 100 WBC
PDW BLD-RTO: 12.5 % (ref 11.8–14.4)
PH UA: 7 (ref 5–8)
PLATELET # BLD: NORMAL K/UL (ref 138–453)
PLATELET, FLUORESCENCE: 195 K/UL (ref 138–453)
PLATELET, IMMATURE FRACTION: 6.9 % (ref 1.1–10.3)
POTASSIUM SERPL-SCNC: 4.8 MMOL/L (ref 3.7–5.3)
PROTEIN UA: NEGATIVE
RBC # BLD: 5.01 M/UL (ref 4.21–5.77)
SODIUM BLD-SCNC: 145 MMOL/L (ref 135–144)
SPECIFIC GRAVITY UA: 1.01 (ref 1–1.03)
TURBIDITY: CLEAR
URINE HGB: NEGATIVE
UROBILINOGEN, URINE: NORMAL
WBC # BLD: 3.7 K/UL (ref 3.5–11.3)

## 2022-04-25 PROCEDURE — 1036F TOBACCO NON-USER: CPT | Performed by: NURSE PRACTITIONER

## 2022-04-25 PROCEDURE — 99214 OFFICE O/P EST MOD 30 MIN: CPT | Performed by: NURSE PRACTITIONER

## 2022-04-25 PROCEDURE — G8419 CALC BMI OUT NRM PARAM NOF/U: HCPCS | Performed by: NURSE PRACTITIONER

## 2022-04-25 PROCEDURE — G8427 DOCREV CUR MEDS BY ELIG CLIN: HCPCS | Performed by: NURSE PRACTITIONER

## 2022-04-25 RX ORDER — VALACYCLOVIR HYDROCHLORIDE 1 G/1
2000 TABLET, FILM COATED ORAL 2 TIMES DAILY
Qty: 8 TABLET | Refills: 5 | Status: SHIPPED | OUTPATIENT
Start: 2022-04-25 | End: 2022-04-27

## 2022-04-25 SDOH — ECONOMIC STABILITY: FOOD INSECURITY: WITHIN THE PAST 12 MONTHS, YOU WORRIED THAT YOUR FOOD WOULD RUN OUT BEFORE YOU GOT MONEY TO BUY MORE.: NEVER TRUE

## 2022-04-25 SDOH — ECONOMIC STABILITY: FOOD INSECURITY: WITHIN THE PAST 12 MONTHS, THE FOOD YOU BOUGHT JUST DIDN'T LAST AND YOU DIDN'T HAVE MONEY TO GET MORE.: NEVER TRUE

## 2022-04-25 ASSESSMENT — ENCOUNTER SYMPTOMS
CONSTIPATION: 0
COUGH: 0
DIARRHEA: 0
RHINORRHEA: 0
SHORTNESS OF BREATH: 0
SORE THROAT: 0

## 2022-04-25 ASSESSMENT — SOCIAL DETERMINANTS OF HEALTH (SDOH): HOW HARD IS IT FOR YOU TO PAY FOR THE VERY BASICS LIKE FOOD, HOUSING, MEDICAL CARE, AND HEATING?: NOT HARD AT ALL

## 2022-04-25 ASSESSMENT — PATIENT HEALTH QUESTIONNAIRE - PHQ9: DEPRESSION UNABLE TO ASSESS: FUNCTIONAL CAPACITY MOTIVATION LIMITS ACCURACY

## 2022-04-25 NOTE — TELEPHONE ENCOUNTER
----- Message from Lynne Ridley sent at 4/25/2022  1:12 PM EDT -----  Subject: Medication Problem    QUESTIONS  Name of Medication? penciclovir (DENAVIR) 1 % cream  Patient-reported dosage and instructions? Apply topically every 2 hours. What question or problem do you have with the medication? Jennifer Owens is calling   because he states the medication doesn't come in 1.5g it starts at 5 g. Please contact Jennifer Owens at [phone]. Please advise  Preferred Pharmacy? [address] [address] [facility]  Pharmacy phone number (if available)? [phone]  Additional Information for Provider?   ---------------------------------------------------------------------------  --------------  [address]  What is the best way for the office to contact you? OK to leave message on   voicemail  Preferred Call Back Phone Number? [phone]  ---------------------------------------------------------------------------  --------------  SCRIPT ANSWERS  Relationship to Patient? Third Party  Third Party Type? Pharmacy? Representative Name?  Jennifer Owens

## 2022-04-25 NOTE — PROGRESS NOTES
pe    [address]   [address]   [phone]    2022     CHIEF COMPLAINT:     Julianne Al (:  1980) is a 42 y.o. male, here for evaluation of the following chief complaint(s):  Pre-op Exam (Surgery on left foot on 22 with Dr. Taylor Giles)      REVIEWED INFORMATION      Allergies   Allergen Reactions    Provigil [Modafinil] Other (See Comments)     Felt like bugs crawling on him    Oxytrol [Oxybutynin] Swelling     Eye Swelling         Current Outpatient Medications   Medication Sig Dispense Refill    atorvastatin (LIPITOR) 20 MG tablet TAKE 1 TAB BY MOUTH EVERY EVENING 31 tablet 0    TOVIAZ 8 MG TB24 TAKE 1 TAB BY MOUTH ONCE EVERY DAY AT BEDTIME AS NEEDED 29 tablet 0    polyethylene glycol (GLYCOLAX) 17 GM/SCOOP powder TAKE 1/2 CAPFUL MIXED WITH 8 OZ. LIQUID ON , TUESDAY, THURSDAY MORNINGS. MAY HOLD DOSE IF HAS DIARRHEA (NOTIFY NURSE) 510 g 0    Cholecalciferol (VITAMIN D3) 50 MCG (2000) TABS Take 1 tablet by mouth daily 30 tablet 5    folic acid (FOLVITE) 1 MG tablet Take 1 tablet by mouth daily 30 tablet 5    levETIRAcetam (KEPPRA) 250 MG tablet TAKE 1 TAB BY MOUTH TWICE A DAY EVERY 12 HOURS 60 tablet 11     No current facility-administered medications for this visit. Patient Care Team:  RICARDO Patino CNP as PCP - General (Nurse Practitioner)  RICARDO Patino CNP as PCP - [facility] Empaneled Provider    REVIEW OF SYSTEMS:     Review of Systems   Constitutional: Negative for activity change, fatigue and unexpected weight change. HENT: Negative for congestion, ear pain, hearing loss, rhinorrhea and sore throat. Respiratory: Negative for cough and shortness of breath. Cardiovascular: Negative for chest pain, palpitations and leg swelling. Gastrointestinal: Negative for constipation and diarrhea. Musculoskeletal: Negative for arthralgias and gait problem.    Skin:        Intermittent history cold sore outbreaks - discussed viral treatment for patient in office - medications ordered. Neurological: Negative for dizziness, weakness and headaches. Psychiatric/Behavioral: Negative for confusion. The patient is not nervous/anxious. HISTORY OF PRESENT ILLNESS     Preoperative Clearance    Patient presents today  to be evaluated for preoperative clearance for left foot surgery- due to significant hammer toe. Procedure is expected to be out patient. . Patient has completed all preoperative testing including: Labs. Are currently pending. The Patient lives in group home due to history of TBI, he will have additional help while at parent home as well. , patient is wheel chair dependant, will have appropriate assistive devices to help with transform and intermittent minimal ambulation. . Patient has not issues with anticoagulation, and is currently not only any blood thinners. She has been advised to hold all NSAIDS 7 days prior to procedure      PHYSICAL EXAM:     BP 98/60 (Site: Left Upper Arm, Position: Sitting, Cuff Size: Medium Adult)   Pulse 69   Temp 95.5 °F (35.3 °C) (Tympanic)   Wt 119 lb (54 kg)   SpO2 95%   BMI 16.60 kg/m²      Physical Exam  Vitals reviewed. Constitutional:       Appearance: Normal appearance. He is not ill-appearing. Cardiovascular:      Rate and Rhythm: Normal rate and regular rhythm. Heart sounds: No murmur heard. No friction rub. No gallop. Pulmonary:      Effort: Pulmonary effort is normal. No respiratory distress. Breath sounds: No wheezing. Abdominal:      General: Bowel sounds are normal.      Palpations: Abdomen is soft. Tenderness: There is no abdominal tenderness. Musculoskeletal:      Right lower leg: No edema. Left lower leg: No edema. Skin:     General: Skin is warm. Findings: No erythema, lesion or rash. Neurological:      Mental Status: He is alert. Cranial Nerves: Dysarthria present.       Motor: Weakness and abnormal muscle tone present. Gait: Gait abnormal (wheel chair dependant). Psychiatric:         Mood and Affect: Mood normal.         Behavior: Behavior is cooperative. PROCEDURE/ IN OFFICE TESTING/ LAB REVIEW     No in office testing or procedures completed during today's office visit. ASSESSMENT/PLAN/ FOLLOWUP:     PLEASE NOTE THAT ANY DISCONTINUATION OF MEDICATIONS OR MEDICAL SUPPLIES REFLECTED IN TODAY'S VISIT SUMMARY  MAY NOT HAVE COMPLETED AS A CHANGE IN YOUR PLAN OF CARE. THESE CHANGES MAY HAVE ONLY BEEN DONE SO IN ORDER TO CLEAN UP LIST FROM DUPLICATIONS OR MISCELLANEOUS SUPPLIES ONLY NEEDED PERIODIC REORDERS. DO NOT DISCONTINUE MEDICATIONS LISTED UNLESS SPECIFICALLY DISCUSSED IN YOUR APPOINTMENT WITH PROVIDER OR SPECIALIST, IF YOU HAVE AN QUESTIONS, PLEASE CONTACT YOUR PROVIDER FOR CLARIFICATION IF NOT ADDRESSED IN YOUR PLAN OF CARE. 1. Preoperative clearance  2. Hammer toe of left foot  3. Seizure disorder [facility])  Comments:  no seizure activity reported  followes with neurology     4. Cold sore  -     penciclovir (DENAVIR) 1 % cream; Apply topically every 2 hours. , Disp-1.5 g, R-1, Normal  -     valACYclovir (VALTREX) 1 g tablet; Take 2 tablets by mouth 2 times daily for 2 days, Disp-8 tablet, R-5Normal      Return for previously scheduled appointment. COMMUNICATION:             The best way to find yourself is to lose yourself in the service of others - [address] [place] [address]. [address] [place] [address]   [email or web address]  Office: [phone]     An electronic signature was used to authenticate this note.   Signed by RICARDO Angelo CNP, APRN-CNP on 4/30/2022 at 1:35 PM

## 2022-05-11 DIAGNOSIS — T50.905A ADVERSE EFFECT OF DRUG, INITIAL ENCOUNTER: ICD-10-CM

## 2022-05-11 DIAGNOSIS — R22.0 SWELLING OF FACE: ICD-10-CM

## 2022-05-11 DIAGNOSIS — E78.00 PURE HYPERCHOLESTEROLEMIA: ICD-10-CM

## 2022-05-11 RX ORDER — DIPHENHYDRAMINE HYDROCHLORIDE 12.5 MG/5ML
SOLUTION ORAL
Qty: 180 ML | Refills: 0 | Status: SHIPPED | OUTPATIENT
Start: 2022-05-11

## 2022-05-11 NOTE — TELEPHONE ENCOUNTER
Last visit: 04/25/22  Last Med refill: No Found history on file  Does patient have enough medication for 72 hours: Yes    Next Visit Date:  Future Appointments   Date Time Provider Cele Inga   11/15/2022  3:40 PM Peter Faust MD Neuro Spec [address] Maintenance   Topic Date Due    COVID-19 Vaccine (3 - Booster for Moderna series) 08/03/2021    Lipids  08/25/2021    Depression Screen  05/24/2022    Flu vaccine (Season Ended) 09/01/2022    DTaP/Tdap/Td vaccine (2 - Td or Tdap) 04/25/2024    HIV screen  Completed    Hepatitis A vaccine  Aged Out    Hepatitis B vaccine  Aged Out    Hib vaccine  Aged Out    Meningococcal (ACWY) vaccine  Aged Out    Pneumococcal 0-64 years Vaccine  Aged Out    Hepatitis C screen  Discontinued       No results found for: LABA1C          ( goal A1C is < 7)   No results found for: LABMICR  LDL Cholesterol (mg/dL)   Date Value   08/25/2020 82   02/05/2019 79     LDL Calculated (mg/dL)   Date Value   11/29/2019 86   04/19/2016 104       (goal LDL is <100)   AST (U/L)   Date Value   05/24/2021 24     ALT (U/L)   Date Value   05/24/2021 30     BUN (mg/dL)   Date Value   05/24/2021 10     BP Readings from Last 3 Encounters:   04/25/22 98/60   11/15/21 112/74   08/03/21 104/64          (goal 120/80)    All Future Testing planned in [facility]  Lab Frequency Next Occurrence               Patient Active Problem List:     TBI (traumatic brain injury) ([facility] [address]     Hyperlipidemia     Intractable epilepsy ([facility] [address]     Narcolepsy     Urinary incontinence     Post traumatic encephalopathy     Constipation

## 2022-05-12 RX ORDER — ATORVASTATIN CALCIUM 20 MG/1
TABLET, FILM COATED ORAL
Qty: 30 TABLET | Refills: 0 | Status: SHIPPED | OUTPATIENT
Start: 2022-05-12 | End: 2022-06-06

## 2022-05-12 NOTE — TELEPHONE ENCOUNTER
Last visit: 04/25/22  Last Med refill: 04/21/22  Does patient have enough medication for 72 hours: Yes    Next Visit Date:  Future Appointments   Date Time Provider Cele Lovelacei   11/15/2022  3:40 PM Lenita Phoenix, MD Neuro Spec [address] Maintenance   Topic Date Due    COVID-19 Vaccine (3 - Booster for Moderna series) 08/03/2021    Lipids  08/25/2021    Depression Screen  05/24/2022    Flu vaccine (Season Ended) 09/01/2022    DTaP/Tdap/Td vaccine (2 - Td or Tdap) 04/25/2024    HIV screen  Completed    Hepatitis A vaccine  Aged Out    Hepatitis B vaccine  Aged Out    Hib vaccine  Aged Out    Meningococcal (ACWY) vaccine  Aged Out    Pneumococcal 0-64 years Vaccine  Aged Out    Hepatitis C screen  Discontinued       No results found for: LABA1C          ( goal A1C is < 7)   No results found for: LABMICR  LDL Cholesterol (mg/dL)   Date Value   08/25/2020 82   02/05/2019 79     LDL Calculated (mg/dL)   Date Value   11/29/2019 86   04/19/2016 104       (goal LDL is <100)   AST (U/L)   Date Value   05/24/2021 24     ALT (U/L)   Date Value   05/24/2021 30     BUN (mg/dL)   Date Value   05/24/2021 10     BP Readings from Last 3 Encounters:   04/25/22 98/60   11/15/21 112/74   08/03/21 104/64          (goal 120/80)    All Future Testing planned in [facility]  Lab Frequency Next Occurrence               Patient Active Problem List:     TBI (traumatic brain injury) ([facility] [address]     Hyperlipidemia     Intractable epilepsy ([facility] [address]     Narcolepsy     Urinary incontinence     Post traumatic encephalopathy     Constipation

## 2022-05-27 RX ORDER — FESOTERODINE FUMARATE 8 MG/1
TABLET, FILM COATED, EXTENDED RELEASE ORAL
Qty: 29 TABLET | Refills: 3 | Status: SHIPPED | OUTPATIENT
Start: 2022-05-27 | End: 2022-08-22

## 2022-05-27 NOTE — TELEPHONE ENCOUNTER
Last visit: 4/25/22  Last Med refill: 4/7/22  Does patient have enough medication for 72 hours: Yes    Next Visit Date:  Future Appointments   Date Time Provider Cele Xiong   11/15/2022  3:40 PM Wu Angeles MD Neuro Spec [address] Maintenance   Topic Date Due    COVID-19 Vaccine (3 - Booster for Moderna series) 08/03/2021    Lipids  08/25/2021    Depression Screen  05/24/2022    Flu vaccine (Season Ended) 09/01/2022    DTaP/Tdap/Td vaccine (2 - Td or Tdap) 04/25/2024    HIV screen  Completed    Hepatitis A vaccine  Aged Out    Hepatitis B vaccine  Aged Out    Hib vaccine  Aged Out    Meningococcal (ACWY) vaccine  Aged Out    Pneumococcal 0-64 years Vaccine  Aged Out    Hepatitis C screen  Discontinued       No results found for: LABA1C          ( goal A1C is < 7)   No results found for: LABMICR  LDL Cholesterol (mg/dL)   Date Value   08/25/2020 82   02/05/2019 79     LDL Calculated (mg/dL)   Date Value   11/29/2019 86   04/19/2016 104       (goal LDL is <100)   AST (U/L)   Date Value   05/24/2021 24     ALT (U/L)   Date Value   05/24/2021 30     BUN (mg/dL)   Date Value   05/24/2021 10     BP Readings from Last 3 Encounters:   04/25/22 98/60   11/15/21 112/74   08/03/21 104/64          (goal 120/80)    All Future Testing planned in [facility]  Lab Frequency Next Occurrence               Patient Active Problem List:     TBI (traumatic brain injury) ([facility] [address]     Hyperlipidemia     Intractable epilepsy ([facility] [address]     Narcolepsy     Urinary incontinence     Post traumatic encephalopathy     Constipation

## 2022-06-06 ENCOUNTER — OFFICE VISIT (OUTPATIENT)
Dept: PRIMARY CARE CLINIC | Age: 42
End: 2022-06-06
Payer: MEDICARE

## 2022-06-06 VITALS
SYSTOLIC BLOOD PRESSURE: 98 MMHG | WEIGHT: 121 LBS | TEMPERATURE: 97.8 F | DIASTOLIC BLOOD PRESSURE: 68 MMHG | HEART RATE: 75 BPM | OXYGEN SATURATION: 97 % | BODY MASS INDEX: 16.88 KG/M2

## 2022-06-06 DIAGNOSIS — E78.00 PURE HYPERCHOLESTEROLEMIA: ICD-10-CM

## 2022-06-06 DIAGNOSIS — E55.9 VITAMIN D DEFICIENCY: ICD-10-CM

## 2022-06-06 DIAGNOSIS — B37.2 CANDIDAL SKIN INFECTION: Primary | ICD-10-CM

## 2022-06-06 DIAGNOSIS — R94.5 LIVER FUNCTION ABNORMALITY: ICD-10-CM

## 2022-06-06 PROCEDURE — G8419 CALC BMI OUT NRM PARAM NOF/U: HCPCS | Performed by: PHYSICIAN ASSISTANT

## 2022-06-06 PROCEDURE — 1036F TOBACCO NON-USER: CPT | Performed by: PHYSICIAN ASSISTANT

## 2022-06-06 PROCEDURE — G8427 DOCREV CUR MEDS BY ELIG CLIN: HCPCS | Performed by: PHYSICIAN ASSISTANT

## 2022-06-06 PROCEDURE — 99213 OFFICE O/P EST LOW 20 MIN: CPT | Performed by: PHYSICIAN ASSISTANT

## 2022-06-06 RX ORDER — ATORVASTATIN CALCIUM 20 MG/1
20 TABLET, FILM COATED ORAL DAILY
Qty: 30 TABLET | Refills: 5 | Status: SHIPPED | OUTPATIENT
Start: 2022-06-06 | End: 2022-11-02 | Stop reason: SDUPTHER

## 2022-06-06 RX ORDER — NYSTATIN 100000 [USP'U]/G
POWDER TOPICAL
Qty: 1 EACH | Refills: 1 | Status: SHIPPED | OUTPATIENT
Start: 2022-06-06

## 2022-06-06 RX ORDER — CHOLECALCIFEROL (VITAMIN D3) 125 MCG
1 CAPSULE ORAL DAILY
Qty: 30 TABLET | Refills: 5 | Status: SHIPPED | OUTPATIENT
Start: 2022-06-06 | End: 2023-06-06

## 2022-06-06 RX ORDER — FOLIC ACID 1 MG/1
1 TABLET ORAL DAILY
Qty: 30 TABLET | Refills: 5 | Status: SHIPPED | OUTPATIENT
Start: 2022-06-06 | End: 2023-06-06

## 2022-06-06 ASSESSMENT — ENCOUNTER SYMPTOMS
RESPIRATORY NEGATIVE: 1
GASTROINTESTINAL NEGATIVE: 1
ROS SKIN COMMENTS: GROIN RASH

## 2022-06-06 NOTE — PROGRESS NOTES
[place] 24 [place]  [address] [facility]  Phone: [phone]  Fax: Dakotah Aleman    Pt Name: Barbara Mccarthy  MRN: [id number]  Kobegfbetty 1980  Date of evaluation: 6/6/2022  Provider: Margy Aase, PA-C     CHIEF COMPLAINT       Chief Complaint   Patient presents with    Other     red groin area x 2 weeks; pt painful while washing; has tried desitin; medicated powders; has not improved           HISTORY OF PRESENT ILLNESS  (Location/Symptom, Timing/Onset, Context/Setting, Quality, Duration, Modifying Factors, Severity.)   Barbara Mccarthy is a 42 y.o. White (non-) [1] male who presents to the office for evaluation of      Rash  This is a new problem. The current episode started 1 to 4 weeks ago. The problem has been waxing and waning since onset. The affected locations include the groin. The rash is characterized by redness and burning. He was exposed to nothing. Pertinent negatives include no fever. Nursing Notes were reviewed. REVIEW OF SYSTEMS    (2-9 systems for level 4, 10 or more for level 5)     Review of Systems   Constitutional: Negative for fever. HENT: Negative. Respiratory: Negative. Gastrointestinal: Negative. Musculoskeletal: Negative. Skin: Positive for rash. Groin rash          Except as noted above the remainder of the review of systems was reviewed andnegative. PAST MEDICAL HISTORY   History reviewed.     Past Medical History:   Diagnosis Date    Adjustment disorder     Dementia ([facility] [address]     Dystonia     Encephalopathy     Encephalopathy, unspecified     Esophageal reflux     Hypercholesteremia     Hyperlipidemia     Localization-related (focal) (partial) epilepsy and epileptic syndromes with simple partial seizures, without mention of intractable epilepsy     Neuropathy     Neuropathy     Persistent disorder of initiating or maintaining wakefulness     Unspecified epilepsy with intractable epilepsy          SURGICAL HISTORY     History reviewed. Past Surgical History:   Procedure Laterality Date    CRANIECTOMY  2004    CRANIOPLASTY  9/15/2004    EYE SURGERY Right     stye removal    OTHER SURGICAL HISTORY  2004    ICP Monitor         CURRENT MEDICATIONS       Current Outpatient Medications   Medication Sig Dispense Refill    nystatin (MYCOSTATIN) 497966 UNIT/GM powder Apply 3 times daily. 1 each 1    atorvastatin (LIPITOR) 20 MG tablet Take 1 tablet by mouth daily 30 tablet 5    Cholecalciferol (VITAMIN D3) 50 MCG (2000 UT) TABS Take 1 tablet by mouth daily 30 tablet 5    folic acid (FOLVITE) 1 MG tablet Take 1 tablet by mouth daily 30 tablet 5    TOVIAZ 8 MG TB24 TAKE 1 TAB BY MOUTH ONCE EVERY DAY AT BEDTIME AS NEEDED 29 tablet 3    VALU-DRYL ALLERGY 12.5 MG/5ML liquid TAKE 10ML BY MOUTH FOUR TIMES A DAY AS NEEDED (ITCHING OR ALLERGIES) 180 mL 0    polyethylene glycol (GLYCOLAX) 17 GM/SCOOP powder TAKE 1/2 CAPFUL MIXED WITH 8 OZ. LIQUID ON , TUESDAY, THURSDAY MORNINGS. MAY HOLD DOSE IF HAS DIARRHEA (NOTIFY NURSE) 510 g 0    levETIRAcetam (KEPPRA) 250 MG tablet TAKE 1 TAB BY MOUTH TWICE A DAY EVERY 12 HOURS 60 tablet 11     No current facility-administered medications for this visit.          ALLERGIES     Provigil [modafinil] and Oxytrol [oxybutynin]    FAMILY HISTORY           Problem Relation Age of Onset    Cancer Maternal Grandmother     High Cholesterol Maternal Grandmother     Other Maternal Grandmother         COPD    Coronary Art Dis Maternal Grandfather     Cancer Other         breast    Diabetes Other     Heart Disease Other     Hypertension Other     Migraines Other      Family Status   Relation Name Status    MGM      MGF      Mother  Alive    Father  Alive    Sister  Alive    PGM      PGF      Other  Alive    Other runs in the family Alive          SOCIAL HISTORY      reports that he quit smoking about 10 years ago. He smoked 0.50 packs per day. He has never used smokeless tobacco. He reports current alcohol use. He reports that he does not use drugs. PHYSICAL EXAM    (up to 7 for level 4, 8 or more for level 5)     Vitals:    06/06/22 1616   BP: 98/68   Site: Left Upper Arm   Pulse: 75   Temp: 97.8 °F (36.6 °C)   SpO2: 97%   Weight: 121 lb (54.9 kg)         Physical Exam  Vitals and nursing note reviewed. Constitutional:       General: He is not in acute distress. Appearance: Normal appearance. He is not ill-appearing. HENT:      Head: Normocephalic and atraumatic. Right Ear: External ear normal.      Left Ear: External ear normal.      Nose: Nose normal.      Mouth/Throat:      Mouth: Mucous membranes are moist.   Eyes:      Extraocular Movements: Extraocular movements intact. Conjunctiva/sclera: Conjunctivae normal.      Pupils: Pupils are equal, round, and reactive to light. Genitourinary:     Comments: Candidal rash noted to groin area  Skin:     General: Skin is warm and dry. Findings: Erythema and rash present. Neurological:      Mental Status: He is alert and oriented to person, place, and time. DIFFERENTIAL DIAGNOSIS:       Gurwinder Sloan reviewed the disposition diagnosis with the patient and or their family/guardian. I have answered their questions and given discharge instructions. They voiced understanding of these instructions and did not have anyfurther questions or complaints. PROCEDURES:  No orders of the defined types were placed in this encounter. No results found for this visit on 06/06/22. FINALIMPRESSION      Visit Diagnoses and Associated Orders     Candidal skin infection    -  Primary         ORDERS WITHOUT AN ASSOCIATED DIAGNOSIS    nystatin (MYCOSTATIN) 400702 UNIT/GM powder [33715]              PLAN     Return if symptoms worsen or fail to improve.       DISCHARGEMEDICATIONS:  Orders Placed This Encounter   Medications    nystatin (MYCOSTATIN) 197085 UNIT/GM powder     Sig: Apply 3 times daily. Dispense:  1 each     Refill:  1         Plan:  1. Keep the rash clean   2. Apply all medication as prescribed  3. Patient educated on keeping area dry   4. Patient educated on signs/ symptoms of infection  5. Patient to follow up with PCP or go to the ER if rash persists or gets worse. 6. Patient given educational material  7. Patient understands and is agreeable. Patient instructed to return to the office if symptoms worsen, return, or have any other concerns. Patient understands and is agreeable.          Pillo Chau PA-C 6/6/2022 6:18 PM

## 2022-06-06 NOTE — TELEPHONE ENCOUNTER
Last visit: 4/25/22  Last Med refill:5/12 ATORVASTATIN, 12/9/2021  Does patient have enough medication for 72 hours: Yes    Next Visit Date:  Future Appointments   Date Time Provider Cele Lovelacei   11/15/2022  3:40 PM Deysi Mercado MD Neuro Spec [address] Maintenance   Topic Date Due    COVID-19 Vaccine (3 - Booster for Moderna series) 08/03/2021    Lipids  08/25/2021    Depression Screen  05/24/2022    Flu vaccine (Season Ended) 09/01/2022    DTaP/Tdap/Td vaccine (2 - Td or Tdap) 04/25/2024    HIV screen  Completed    Hepatitis A vaccine  Aged Out    Hepatitis B vaccine  Aged Out    Hib vaccine  Aged Out    Meningococcal (ACWY) vaccine  Aged Out    Pneumococcal 0-64 years Vaccine  Aged Out    Hepatitis C screen  Discontinued       No results found for: LABA1C          ( goal A1C is < 7)   No results found for: LABMICR  LDL Cholesterol (mg/dL)   Date Value   08/25/2020 82   02/05/2019 79     LDL Calculated (mg/dL)   Date Value   11/29/2019 86   04/19/2016 104       (goal LDL is <100)   AST (U/L)   Date Value   05/24/2021 24     ALT (U/L)   Date Value   05/24/2021 30     BUN (mg/dL)   Date Value   05/24/2021 10     BP Readings from Last 3 Encounters:   04/25/22 98/60   11/15/21 112/74   08/03/21 104/64          (goal 120/80)    All Future Testing planned in [facility]  Lab Frequency Next Occurrence               Patient Active Problem List:     TBI (traumatic brain injury) ([facility] [address]     Hyperlipidemia     Intractable epilepsy ([facility] [address]     Narcolepsy     Urinary incontinence     Post traumatic encephalopathy     Constipation

## 2022-06-06 NOTE — PATIENT INSTRUCTIONS
Patient Education        Yeast Skin Infection: Care Instructions  Your Care Instructions     Yeast normally lives on your skin. Sometimes too much yeast can overgrow in certain areas of the skin and cause an infection. The infection causes red,scaly, moist patches on your skin that may itch. Common areas for skin yeast infections are skin folds under the breasts or belly area. The warm and moist areas in the skin folds can make it easier for yeast to overgrow. Yeast infections also can be found on other parts of thebody such as the groin or armpits. You will probably get a cream or ointment that contains an antifungal medicine. Examples of these are miconazole and clotrimazole. You put it on your skin to treat the infection. Your doctor may give you a prescription for the cream or ointment. Or you may be able to [facility]. If the infection is severe, the doctor will prescribe antifungal pills. A yeast infection usually goes away after about a week of treatment. But it'simportant to use the medicine for as long as your doctor tells you to. Follow-up care is a key part of your treatment and safety. Be sure to make and go to all appointments, and call your doctor if you are having problems. It's also a good idea to know your test results and keep alist of the medicines you take. How can you care for yourself at home?  Be safe with medicines. Take your medicines exactly as prescribed. Call your doctor if you think you are having a problem with your medicine.  Keep your skin clean and dry. Your doctor may suggest using powder that contains an antifungal medicine in the skin folds.  Wear loose clothing. When should you call for help? Call your doctor now or seek immediate medical care if:     You have symptoms of infection, such as:  ? Increased pain, swelling, warmth, or redness. ? Red streaks leading from the area. ? Pus draining from the area. ? A fever. Watch closely for changes in your health, and be sure to contact your doctor if:     You do not get better as expected. Where can you learn more? Go to [email or web address] org and sign in to your [employer] account. Enter S897 in the Evermede box to learn more about \"Yeast Skin Infection: Care Instructions. \"     If you do not have an account, please click on the \"Sign Up Now\" link. Current as of: November 15, 2021               Content Version: 13.2  © 9441-0966 Healthwise, Incorporated. Care instructions adapted under license by [facility] ([facility]). If you have questions about a medical condition or this instruction, always ask your healthcare professional. [address] any warranty or liability for your use of this information.

## 2022-08-22 RX ORDER — FESOTERODINE FUMARATE 8 MG/1
1 TABLET, EXTENDED RELEASE ORAL NIGHTLY PRN
Qty: 30 TABLET | Refills: 5 | Status: SHIPPED | OUTPATIENT
Start: 2022-08-22 | End: 2023-08-22

## 2022-08-22 NOTE — TELEPHONE ENCOUNTER
Request for Toviaz.     Last script sent: 5/27/22  Last OV: 4/25/22  Next Visit Date:  Future Appointments   Date Time Provider Cele Xiong   8/25/2022  3:30 PM RICARDO Richey - CNP [place]  [facility]   11/15/2022  3:40 PM Robb Roy MD Neuro Spec [address] Maintenance   Topic Date Due    COVID-19 Vaccine (3 - Booster for Moderna series) 08/03/2021    Lipids  08/25/2021    Depression Screen  05/24/2022    Flu vaccine (1) 09/01/2022    DTaP/Tdap/Td vaccine (2 - Td or Tdap) 04/25/2024    HIV screen  Completed    Hepatitis A vaccine  Aged Out    Hepatitis B vaccine  Aged Out    Hib vaccine  Aged Out    Meningococcal (ACWY) vaccine  Aged Out    Pneumococcal 0-64 years Vaccine  Aged Out    Hepatitis C screen  Discontinued       No results found for: LABA1C          ( goal A1C is < 7)   No results found for: LABMICR  LDL Cholesterol (mg/dL)   Date Value   08/25/2020 82     LDL Calculated (mg/dL)   Date Value   11/29/2019 86       (goal LDL is <100)   AST (U/L)   Date Value   05/24/2021 24     ALT (U/L)   Date Value   05/24/2021 30     BUN (mg/dL)   Date Value   05/24/2021 10     BP Readings from Last 3 Encounters:   06/06/22 98/68   04/25/22 98/60   11/15/21 112/74          (goal 120/80)    All Future Testing planned in [facility]  Lab Frequency Next Occurrence         Patient Active Problem List:     TBI (traumatic brain injury) ([facility] [address]     Hyperlipidemia     Intractable epilepsy ([facility] [address]     Narcolepsy     Urinary incontinence     Post traumatic encephalopathy     Constipation

## 2022-08-25 ENCOUNTER — OFFICE VISIT (OUTPATIENT)
Dept: FAMILY MEDICINE CLINIC | Age: 42
End: 2022-08-25
Payer: MEDICARE

## 2022-08-25 VITALS
TEMPERATURE: 97.4 F | SYSTOLIC BLOOD PRESSURE: 108 MMHG | BODY MASS INDEX: 16.32 KG/M2 | WEIGHT: 117 LBS | OXYGEN SATURATION: 94 % | HEART RATE: 71 BPM | DIASTOLIC BLOOD PRESSURE: 74 MMHG

## 2022-08-25 DIAGNOSIS — B00.1 COLD SORE: ICD-10-CM

## 2022-08-25 DIAGNOSIS — Z00.00 ANNUAL PHYSICAL EXAM: Primary | ICD-10-CM

## 2022-08-25 DIAGNOSIS — K59.04 CHRONIC IDIOPATHIC CONSTIPATION: ICD-10-CM

## 2022-08-25 PROCEDURE — G0438 PPPS, INITIAL VISIT: HCPCS | Performed by: NURSE PRACTITIONER

## 2022-08-25 RX ORDER — POLYETHYLENE GLYCOL 3350 17 G/17G
POWDER, FOR SOLUTION ORAL
Qty: 510 G | Refills: 0 | Status: SHIPPED | OUTPATIENT
Start: 2022-08-25

## 2022-08-25 RX ORDER — ACYCLOVIR 400 MG/1
400 TABLET ORAL 2 TIMES DAILY
Qty: 60 TABLET | Refills: 0 | Status: SHIPPED | OUTPATIENT
Start: 2022-08-25 | End: 2022-09-28 | Stop reason: SDUPTHER

## 2022-08-25 ASSESSMENT — PATIENT HEALTH QUESTIONNAIRE - PHQ9: DEPRESSION UNABLE TO ASSESS: FUNCTIONAL CAPACITY MOTIVATION LIMITS ACCURACY

## 2022-08-31 ASSESSMENT — ENCOUNTER SYMPTOMS
RHINORRHEA: 0
SHORTNESS OF BREATH: 0
CONSTIPATION: 0
SORE THROAT: 0
DIARRHEA: 0
COUGH: 0

## 2022-09-01 NOTE — PROGRESS NOTES
[address] [facility]   [address]  [phone]    2022     CHIEF COMPLAINT:     Jamir Baires (:  1980) is a 42 y.o. male, here for evaluation of the following chief complaint(s): Annual Exam (Pt is here with mother), Mouth Lesions (Pt keeps getting cold sores, does not do well with pills), and Incontinence (Would like to discuss getting briefs, the ones he is getting know don't fit him very well and he has accidents out the side or down the leg)      REVIEWED INFORMATION      Allergies   Allergen Reactions    Provigil [Modafinil] Other (See Comments)     Felt like bugs crawling on him    Oxytrol [Oxybutynin] Swelling     Eye Swelling         Current Outpatient Medications   Medication Sig Dispense Refill    acyclovir (ZOVIRAX) 400 MG tablet Take 1 tablet by mouth 2 times daily 60 tablet 0    polyethylene glycol (GLYCOLAX) 17 GM/SCOOP powder TAKE 1/2 CAPFUL MIXED WITH 8 OZ. LIQUID ON , TUESDAY, Wednesday, THURSDAY MORNINGS. MAY HOLD DOSE IF HAS DIARRHEA (NOTIFY NURSE) 510 g 0    Fesoterodine Fumarate ER (TOVIAZ) 8 MG TB24 Take 1 tablet by mouth nightly as needed (urinary incontinence) 30 tablet 5    atorvastatin (LIPITOR) 20 MG tablet Take 1 tablet by mouth daily 30 tablet 5    Cholecalciferol (VITAMIN D3) 50 MCG (2000 UT) TABS Take 1 tablet by mouth daily 30 tablet 5    folic acid (FOLVITE) 1 MG tablet Take 1 tablet by mouth daily 30 tablet 5    nystatin (MYCOSTATIN) 646899 UNIT/GM powder Apply 3 times daily. 1 each 1    VALU-DRYL ALLERGY 12.5 MG/5ML liquid TAKE 10ML BY MOUTH FOUR TIMES A DAY AS NEEDED (ITCHING OR ALLERGIES) 180 mL 0    levETIRAcetam (KEPPRA) 250 MG tablet TAKE 1 TAB BY MOUTH TWICE A DAY EVERY 12 HOURS 60 tablet 11     No current facility-administered medications for this visit.         Patient Care Team:  RICARDO Vicente CNP as PCP - General (Nurse Practitioner)  RICARDO Vicente CNP as PCP - [facility] Provider    REVIEW OF SYSTEMS:     Review of Systems   Constitutional:  Negative for activity change, fatigue and unexpected weight change. HENT:  Negative for congestion, ear pain, hearing loss, rhinorrhea and sore throat. Respiratory:  Negative for cough and shortness of breath. Cardiovascular:  Negative for chest pain, palpitations and leg swelling. Gastrointestinal:  Negative for constipation and diarrhea. Musculoskeletal:  Negative for arthralgias and gait problem. Neurological:  Negative for dizziness, weakness and headaches. Psychiatric/Behavioral:  Negative for confusion. The patient is not nervous/anxious. HISTORY OF PRESENT ILLNESS     ANNUAL PHYSICAL    Patient presenting for annual exam. Patient concerns today include none of the following: fatigue, weakness, fever, night sweats, dizziness, high blood pressure, low blood pressure, leg swelling, abdominal pain, GERD, urination problems, constipation, diarrhea, chest pain, shortness of breath, and palpitations. . Patient has maintained a stable weight and does not exercise. Patient is up to date with immunizations. History includes no family history of colon cancer. Patient is stable for his overall condition. However there is concern raised in regards to continuous cold sores patient has been to experiencing. We have tried attempts at getting topical antiviral medication. Which has been denied by insurance due to requirement for patient to use oral antivirals. However is very difficult for patient to swallow we have attempted to get oral liquid form of new lotions once again denied by insurance. We have tried valacyclovir oral tablets in the past patient has been unable to digest, and due to his difficulty with swallowing.   We will try with the acyclovir if no improvement and patient is unable to tolerate we will again request the liquid formulation    PHYSICAL EXAM:     /74 (Site: Left Upper Arm, Position: Sitting, Cuff Size: Medium Adult)   Pulse 71   Temp 97.4 °F (36.3 °C) (Tympanic)   Wt 117 lb (53.1 kg)   SpO2 94%   BMI 16.32 kg/m²      Physical Exam  Vitals reviewed. Constitutional:       Appearance: Normal appearance. He is not ill-appearing. Cardiovascular:      Rate and Rhythm: Normal rate and regular rhythm. Heart sounds: No murmur heard. No friction rub. No gallop. Pulmonary:      Effort: Pulmonary effort is normal. No respiratory distress. Breath sounds: No wheezing. Abdominal:      General: Bowel sounds are normal.      Palpations: Abdomen is soft. Tenderness: There is no abdominal tenderness. Musculoskeletal:      Right lower leg: No edema. Left lower leg: No edema. Skin:     General: Skin is warm. Findings: No erythema, lesion or rash. Neurological:      Mental Status: He is alert. Cranial Nerves: Dysarthria present. Motor: Weakness and abnormal muscle tone present. Gait: Gait abnormal (wheel chair dependant). Psychiatric:         Mood and Affect: Mood normal.         Behavior: Behavior is cooperative. PROCEDURE/ IN OFFICE TESTING/ LAB REVIEW     No in office testing or procedures completed during today's office visit. ASSESSMENT/PLAN/ FOLLOWUP:     PLEASE NOTE THAT ANY DISCONTINUATION OF MEDICATIONS OR MEDICAL SUPPLIES REFLECTED IN TODAY'S VISIT SUMMARY  MAY NOT HAVE COMPLETED AS A CHANGE IN YOUR PLAN OF CARE. THESE CHANGES MAY HAVE ONLY BEEN DONE SO IN ORDER TO CLEAN UP LIST FROM DUPLICATIONS OR MISCELLANEOUS SUPPLIES ONLY NEEDED PERIODIC REORDERS. DO NOT DISCONTINUE MEDICATIONS LISTED UNLESS SPECIFICALLY DISCUSSED IN YOUR APPOINTMENT WITH PROVIDER OR SPECIALIST, IF YOU HAVE AN QUESTIONS, PLEASE CONTACT YOUR PROVIDER FOR CLARIFICATION IF NOT ADDRESSED IN YOUR PLAN OF CARE. 1. Annual physical exam  2. Cold sore  -     acyclovir (ZOVIRAX) 400 MG tablet; Take 1 tablet by mouth 2 times daily, Disp-60 tablet, R-0Normal  3.  Chronic idiopathic constipation  -     polyethylene glycol (GLYCOLAX) 17 GM/SCOOP powder; TAKE 1/2 CAPFUL MIXED WITH 8 OZ. LIQUID ON SUNDAY, TUESDAY, Wednesday, THURSDAY MORNINGS. MAY HOLD DOSE IF HAS DIARRHEA (NOTIFY NURSE), Disp-510 g, R-0Normal      Return in about 6 months (around 2/25/2023). COMMUNICATION:             The best way to find yourself is to lose yourself in the service of others - [address] [place] [address]. [address] [facility]   [email or web address]  Office: [phone]     An electronic signature was used to authenticate this note.   Signed by RICARDO Miles CNP, APRN-CNP on 8/31/2022 at 10:52 PM

## 2022-09-11 ENCOUNTER — PATIENT MESSAGE (OUTPATIENT)
Dept: FAMILY MEDICINE CLINIC | Age: 42
End: 2022-09-11

## 2022-09-11 DIAGNOSIS — B00.1 COLD SORE: ICD-10-CM

## 2022-09-12 NOTE — TELEPHONE ENCOUNTER
I believe I wrote this as a daily preventative medication as he was getting them so frequently, an to help test this out so we could order him the acyclovir liquid as needed only.

## 2022-09-12 NOTE — TELEPHONE ENCOUNTER
From: Estelle Rodriguez  To: Lady Monte  Sent: 9/11/2022 9:49 AM EDT  Subject: acyclovir    Should Michel Ding be taking acyclovir daily as a preventative med, or as soon as signs of a cold sore begin? If its not to be taken daily, he will need a written note stating its a PRN (following the rules of Dipt[employer] Levin.) I know we discussed this med as a trial and asking for a liquid med down the road, but needed clarification in the meantime on administering acyclovir since they filled the script. If you have questions feel free to call me [phone].  Thanks, [employer]

## 2022-09-12 NOTE — TELEPHONE ENCOUNTER
The script that was sent was BID with a quantity of 60 tablets. Is the patient supposed to take this medication daily or only PRN?

## 2022-09-28 RX ORDER — ACYCLOVIR 400 MG/1
400 TABLET ORAL 2 TIMES DAILY
Qty: 60 TABLET | Refills: 3 | Status: SHIPPED | OUTPATIENT
Start: 2022-09-28 | End: 2022-10-28

## 2022-09-28 NOTE — TELEPHONE ENCOUNTER
Last visit: 8/25/22  Last Med refill: 8/25/22  Does patient have enough medication for 72 hours:     Next Visit Date:  Future Appointments   Date Time Provider Cele Xiong   11/15/2022  3:40 PM Hattie Motta MD Neuro Spec [facility]   2/20/2023  3:30 PM RICARDO Poole - [facility] [address] Maintenance   Topic Date Due    Hepatitis B vaccine (3 of 4 - 4-dose series) 09/24/2003    Lipids  08/25/2021    Depression Screen  05/24/2022    Flu vaccine (1) 08/01/2022    DTaP/Tdap/Td vaccine (2 - Td or Tdap) 04/25/2024    COVID-19 Vaccine  Completed    HIV screen  Completed    Hepatitis A vaccine  Aged Out    Hib vaccine  Aged Out    Meningococcal (ACWY) vaccine  Aged Out    Pneumococcal 0-64 years Vaccine  Aged Out    Hepatitis C screen  Discontinued       No results found for: LABA1C          ( goal A1C is < 7)   No results found for: LABMICR  LDL Cholesterol (mg/dL)   Date Value   08/25/2020 82   02/05/2019 79     LDL Calculated (mg/dL)   Date Value   11/29/2019 86   04/19/2016 104       (goal LDL is <100)   AST (U/L)   Date Value   05/24/2021 24     ALT (U/L)   Date Value   05/24/2021 30     BUN (mg/dL)   Date Value   05/24/2021 10     BP Readings from Last 3 Encounters:   08/25/22 108/74   06/06/22 98/68   04/25/22 98/60          (goal 120/80)    All Future Testing planned in [facility]  Lab Frequency Next Occurrence               Patient Active Problem List:     TBI (traumatic brain injury) ([facility] [address]     Hyperlipidemia     Intractable epilepsy ([facility] [address]     Narcolepsy     Urinary incontinence     Post traumatic encephalopathy     Constipation

## 2022-11-02 DIAGNOSIS — E78.00 PURE HYPERCHOLESTEROLEMIA: ICD-10-CM

## 2022-11-02 RX ORDER — ATORVASTATIN CALCIUM 20 MG/1
20 TABLET, FILM COATED ORAL DAILY
Qty: 90 TABLET | Refills: 1 | Status: SHIPPED | OUTPATIENT
Start: 2022-11-02 | End: 2022-11-04 | Stop reason: SDUPTHER

## 2022-11-02 NOTE — TELEPHONE ENCOUNTER
Request for lipitor.     Last script sent: 6/6/22  Last OV: 8/25/22  Next Visit Date:  Future Appointments   Date Time Provider Cele Lovelacei   11/15/2022  3:40 PM Valeria Lowry MD Neuro Spec Neurology -   2/20/2023  3:30 PM Alina Cuevas APRN - CNP [facility] AND [facility] [address] Maintenance   Topic Date Due    Lipids  08/25/2021    COVID-19 Vaccine (4 - Booster for Moderna series) 02/17/2022    Depression Screen  05/24/2022    Flu vaccine (1) 08/01/2022    DTaP/Tdap/Td vaccine (2 - Td or Tdap) 04/25/2024    HIV screen  Completed    Hepatitis A vaccine  Aged Out    Hib vaccine  Aged Out    Meningococcal (ACWY) vaccine  Aged Out    Pneumococcal 0-64 years Vaccine  Aged Out    Hepatitis C screen  Discontinued       No results found for: LABA1C          ( goal A1C is < 7)   No results found for: LABMICR  LDL Cholesterol (mg/dL)   Date Value   08/25/2020 82     LDL Calculated (mg/dL)   Date Value   11/29/2019 86       (goal LDL is <100)   AST (U/L)   Date Value   05/24/2021 24     ALT (U/L)   Date Value   05/24/2021 30     BUN (mg/dL)   Date Value   05/24/2021 10     BP Readings from Last 3 Encounters:   08/25/22 108/74   06/06/22 98/68   04/25/22 98/60          (goal 120/80)    All Future Testing planned in [facility]  Lab Frequency Next Occurrence         Patient Active Problem List:     TBI (traumatic brain injury)     Hyperlipidemia     Intractable epilepsy ([facility] [address]     Narcolepsy     Urinary incontinence     Post traumatic encephalopathy     Constipation

## 2022-11-04 DIAGNOSIS — E78.00 PURE HYPERCHOLESTEROLEMIA: ICD-10-CM

## 2022-11-04 RX ORDER — ATORVASTATIN CALCIUM 20 MG/1
20 TABLET, FILM COATED ORAL DAILY
Qty: 90 TABLET | Refills: 1 | Status: SHIPPED | OUTPATIENT
Start: 2022-11-04 | End: 2023-11-04

## 2022-11-04 NOTE — TELEPHONE ENCOUNTER
Rx was sent to wrong pharmacy, [facility] needs [facility], pharmacy changed and med pending. Did not  at [facility].

## 2022-11-07 DIAGNOSIS — G40.909 SEIZURE DISORDER (HCC): ICD-10-CM

## 2022-11-07 DIAGNOSIS — R94.5 LIVER FUNCTION ABNORMALITY: ICD-10-CM

## 2022-11-07 DIAGNOSIS — E55.9 VITAMIN D DEFICIENCY: ICD-10-CM

## 2022-11-07 RX ORDER — CHOLECALCIFEROL (VITAMIN D3) 125 MCG
1 CAPSULE ORAL DAILY
Qty: 90 TABLET | Refills: 1 | Status: SHIPPED | OUTPATIENT
Start: 2022-11-07 | End: 2023-11-07

## 2022-11-07 RX ORDER — FOLIC ACID 1 MG/1
1 TABLET ORAL DAILY
Qty: 90 TABLET | Refills: 1 | Status: SHIPPED | OUTPATIENT
Start: 2022-11-07 | End: 2023-11-07

## 2022-11-07 NOTE — TELEPHONE ENCOUNTER
If she can sign up for Mychart, would recommend video visit. Please contact patient. Last visit: 08/25/2022  Last Med refill: 06/06/2022  Does patient have enough medication for 72 hours: Yes    Next Visit Date:  Future Appointments   Date Time Provider Cele Xiong   2/20/2023  3:30 PM RICARDO Zaldivar CNP [facility] [address] Maintenance   Topic Date Due    Lipids  08/25/2021    COVID-19 Vaccine (4 - Booster for Moderna series) 02/17/2022    Depression Screen  05/24/2022    Flu vaccine (1) 08/01/2022    DTaP/Tdap/Td vaccine (2 - Td or Tdap) 04/25/2024    HIV screen  Completed    Hepatitis A vaccine  Aged Out    Hib vaccine  Aged Out    Meningococcal (ACWY) vaccine  Aged Out    Pneumococcal 0-64 years Vaccine  Aged Out    Hepatitis C screen  Discontinued       No results found for: LABA1C          ( goal A1C is < 7)   No results found for: LABMICR  LDL Cholesterol (mg/dL)   Date Value   08/25/2020 82   02/05/2019 79     LDL Calculated (mg/dL)   Date Value   11/29/2019 86   04/19/2016 104       (goal LDL is <100)   AST (U/L)   Date Value   05/24/2021 24     ALT (U/L)   Date Value   05/24/2021 30     BUN (mg/dL)   Date Value   05/24/2021 10     BP Readings from Last 3 Encounters:   08/25/22 108/74   06/06/22 98/68   04/25/22 98/60          (goal 120/80)    All Future Testing planned in [facility]  Lab Frequency Next Occurrence               Patient Active Problem List:     TBI (traumatic brain injury)     Hyperlipidemia     Intractable epilepsy ([facility] [address]     Narcolepsy     Urinary incontinence     Post traumatic encephalopathy     Constipation

## 2022-11-08 DIAGNOSIS — G40.909 SEIZURE DISORDER (HCC): ICD-10-CM

## 2022-11-08 DIAGNOSIS — K59.04 CHRONIC IDIOPATHIC CONSTIPATION: ICD-10-CM

## 2022-11-08 DIAGNOSIS — B00.1 COLD SORE: ICD-10-CM

## 2022-11-08 RX ORDER — ACYCLOVIR 400 MG/1
400 TABLET ORAL 2 TIMES DAILY
Qty: 60 TABLET | Refills: 1 | Status: SHIPPED | OUTPATIENT
Start: 2022-11-08 | End: 2022-12-08

## 2022-11-08 RX ORDER — LEVETIRACETAM 250 MG/1
TABLET ORAL
Qty: 62 TABLET | Refills: 0 | OUTPATIENT
Start: 2022-11-08

## 2022-11-08 NOTE — TELEPHONE ENCOUNTER
Patient sent a message through [employer] requesting a new Keppra Rx. Medication active on med list: yes      Date of last fill: 11/8/21 for #60 and 11 refills  verified on 11/8/2022    verified by Florencia Tyson LPN      Date of last appointment: 11/15/2021    Next Visit Date: Patient cancelled his 11/15/22 appointment and sent a message that he would reschedule for 2023 once the schedule was available.

## 2022-11-09 RX ORDER — POLYETHYLENE GLYCOL 3350 17 G/17G
POWDER, FOR SOLUTION ORAL
Qty: 136 G | Refills: 3 | Status: SHIPPED | OUTPATIENT
Start: 2022-11-09

## 2022-11-09 RX ORDER — LEVETIRACETAM 250 MG/1
TABLET ORAL
Qty: 60 TABLET | Refills: 5 | Status: SHIPPED | OUTPATIENT
Start: 2022-11-09

## 2022-11-09 NOTE — TELEPHONE ENCOUNTER
Last visit: 8/25/22  Last Med refill: 8/25/22  Does patient have enough medication for 72 hours: Yes    Next Visit Date:  Future Appointments   Date Time Provider Cele Xiong   2/20/2023  3:30 PM RICARDO Mcfarland CNP [facility] AND [facility] [address] Maintenance   Topic Date Due    Lipids  08/25/2021    COVID-19 Vaccine (4 - Booster for Moderna series) 02/17/2022    Depression Screen  05/24/2022    Flu vaccine (1) 08/01/2022    DTaP/Tdap/Td vaccine (2 - Td or Tdap) 04/25/2024    HIV screen  Completed    Hepatitis A vaccine  Aged Out    Hib vaccine  Aged Out    Meningococcal (ACWY) vaccine  Aged Out    Pneumococcal 0-64 years Vaccine  Aged Out    Hepatitis C screen  Discontinued       No results found for: LABA1C          ( goal A1C is < 7)   No results found for: LABMICR  LDL Cholesterol (mg/dL)   Date Value   08/25/2020 82   02/05/2019 79     LDL Calculated (mg/dL)   Date Value   11/29/2019 86   04/19/2016 104       (goal LDL is <100)   AST (U/L)   Date Value   05/24/2021 24     ALT (U/L)   Date Value   05/24/2021 30     BUN (mg/dL)   Date Value   05/24/2021 10     BP Readings from Last 3 Encounters:   08/25/22 108/74   06/06/22 98/68   04/25/22 98/60          (goal 120/80)    All Future Testing planned in [facility]  Lab Frequency Next Occurrence               Patient Active Problem List:     TBI (traumatic brain injury)     Hyperlipidemia     Intractable epilepsy ([facility] [address]     Narcolepsy     Urinary incontinence     Post traumatic encephalopathy     Constipation

## 2023-02-03 ENCOUNTER — TELEPHONE (OUTPATIENT)
Dept: FAMILY MEDICINE CLINIC | Age: 43
End: 2023-02-03

## 2023-02-03 NOTE — TELEPHONE ENCOUNTER
[facility] mailed a form regarding the patients medication. The form states they will give the patient a temporary 30 day supply of  Fesoterodine Fumarate ER due to this medication not being on the formulary. The suggested alternative medications are Oxybutynin ER- tier 1, Myrbetriq- tier 3, Solifenacin- tier 3, Tolterodine- tier 3, and Trospium Tabs- tier 3. Writer called the number on the form to start a PA because the patient is allergic to Oxybutynin. They will be faxing over the PA forms.

## 2023-02-03 NOTE — TELEPHONE ENCOUNTER
Patient has been and several medication and did not work - or difficulty with swallowing. He has to have a lot of his stuff crushed so extend this is the only extended release that I believe did not cause an issue for hime.

## 2023-02-14 DIAGNOSIS — B00.1 COLD SORE: ICD-10-CM

## 2023-02-14 NOTE — TELEPHONE ENCOUNTER
Request for zovirax.     Last script sent: 11/8/22  Last OV: 8/25/22  Next Visit Date:  Future Appointments   Date Time Provider Cele Xiong   3/21/2023  3:30 PM RICARDO Severino CNP [facility] AND [facility] [address] Maintenance   Topic Date Due    Lipids  08/25/2021    COVID-19 Vaccine (4 - Booster for Moderna series) 02/17/2022    Depression Screen  05/24/2022    Flu vaccine (1) 08/01/2022    DTaP/Tdap/Td vaccine (2 - Td or Tdap) 04/25/2024    HIV screen  Completed    Hepatitis A vaccine  Aged Out    Hib vaccine  Aged Out    Meningococcal (ACWY) vaccine  Aged Out    Pneumococcal 0-64 years Vaccine  Aged Out    Hepatitis C screen  Discontinued       No results found for: LABA1C          ( goal A1C is < 7)   No results found for: LABMICR  LDL Cholesterol (mg/dL)   Date Value   08/25/2020 82     LDL Calculated (mg/dL)   Date Value   11/29/2019 86       (goal LDL is <100)   AST (U/L)   Date Value   05/24/2021 24     ALT (U/L)   Date Value   05/24/2021 30     BUN (mg/dL)   Date Value   05/24/2021 10     BP Readings from Last 3 Encounters:   08/25/22 108/74   06/06/22 98/68   04/25/22 98/60          (goal 120/80)    All Future Testing planned in [facility]  Lab Frequency Next Occurrence         Patient Active Problem List:     TBI (traumatic brain injury)     Hyperlipidemia     Intractable epilepsy ([facility] [address]     Narcolepsy     Urinary incontinence     Post traumatic encephalopathy     Constipation

## 2023-02-18 RX ORDER — ACYCLOVIR 400 MG/1
400 TABLET ORAL 2 TIMES DAILY
Qty: 62 TABLET | Refills: 5 | Status: SHIPPED | OUTPATIENT
Start: 2023-02-18

## 2023-03-13 NOTE — TELEPHONE ENCOUNTER
Request for Toviaz.    Last script sent: 8/22/22  Last OV: 8/25/2022  Next Visit Date:  Future Appointments   Date Time Provider Department Center   3/21/2023  3:30 PM RICARDO Siddiqi - CNP [facility]       Health Maintenance   Topic Date Due    Lipids  08/25/2021    COVID-19 Vaccine (4 - Booster for Moderna series) 02/17/2022    Depression Screen  05/24/2022    Flu vaccine (1) 08/01/2022    DTaP/Tdap/Td vaccine (2 - Td or Tdap) 04/25/2024    HIV screen  Completed    Hepatitis A vaccine  Aged Out    Hib vaccine  Aged Out    Meningococcal (ACWY) vaccine  Aged Out    Pneumococcal 0-64 years Vaccine  Aged Out    Hepatitis C screen  Discontinued       No results found for: LABA1C          ( goal A1C is < 7)   No results found for: LABMICR  LDL Cholesterol (mg/dL)   Date Value   08/25/2020 82     LDL Calculated (mg/dL)   Date Value   11/29/2019 86       (goal LDL is <100)   AST (U/L)   Date Value   05/24/2021 24     ALT (U/L)   Date Value   05/24/2021 30     BUN (mg/dL)   Date Value   05/24/2021 10     BP Readings from Last 3 Encounters:   08/25/22 108/74   06/06/22 98/68   04/25/22 98/60          (goal 120/80)    All Future Testing planned in [facility]  Lab Frequency Next Occurrence         Patient Active Problem List:     TBI (traumatic brain injury)     Hyperlipidemia     Intractable epilepsy (HCC)     Narcolepsy     Urinary incontinence     Post traumatic encephalopathy     Constipation

## 2023-03-14 RX ORDER — FESOTERODINE FUMARATE 8 MG/1
1 TABLET, EXTENDED RELEASE ORAL NIGHTLY PRN
Qty: 30 TABLET | Refills: 5 | Status: SHIPPED | OUTPATIENT
Start: 2023-03-14

## 2023-03-21 ENCOUNTER — OFFICE VISIT (OUTPATIENT)
Dept: FAMILY MEDICINE CLINIC | Age: 43
End: 2023-03-21

## 2023-03-21 VITALS
SYSTOLIC BLOOD PRESSURE: 106 MMHG | TEMPERATURE: 97 F | BODY MASS INDEX: 15.89 KG/M2 | WEIGHT: 113.9 LBS | HEART RATE: 68 BPM | DIASTOLIC BLOOD PRESSURE: 66 MMHG

## 2023-03-21 DIAGNOSIS — G83.9: ICD-10-CM

## 2023-03-21 DIAGNOSIS — E78.00 PURE HYPERCHOLESTEROLEMIA: ICD-10-CM

## 2023-03-21 DIAGNOSIS — G25.82 MUSCULAR RIGIDITY AND SPASM, PROGRESSIVE: ICD-10-CM

## 2023-03-21 DIAGNOSIS — R73.01 IFG (IMPAIRED FASTING GLUCOSE): ICD-10-CM

## 2023-03-21 DIAGNOSIS — N39.498 OTHER URINARY INCONTINENCE: ICD-10-CM

## 2023-03-21 DIAGNOSIS — F81.89 DEVELOPMENTAL NON-VERBAL DISORDER: ICD-10-CM

## 2023-03-21 DIAGNOSIS — S06.9X9S TRAUMATIC BRAIN INJURY WITH LOSS OF CONSCIOUSNESS, SEQUELA (HCC): Primary | Chronic | ICD-10-CM

## 2023-03-21 DIAGNOSIS — G40.909 SEIZURE DISORDER (HCC): ICD-10-CM

## 2023-03-21 DIAGNOSIS — Z86.19 HISTORY OF COLD SORES: Chronic | ICD-10-CM

## 2023-03-21 DIAGNOSIS — Z99.3 WHEELCHAIR DEPENDENT: ICD-10-CM

## 2023-03-21 SDOH — ECONOMIC STABILITY: FOOD INSECURITY: WITHIN THE PAST 12 MONTHS, THE FOOD YOU BOUGHT JUST DIDN'T LAST AND YOU DIDN'T HAVE MONEY TO GET MORE.: NEVER TRUE

## 2023-03-21 SDOH — ECONOMIC STABILITY: HOUSING INSECURITY
IN THE LAST 12 MONTHS, WAS THERE A TIME WHEN YOU DID NOT HAVE A STEADY PLACE TO SLEEP OR SLEPT IN A SHELTER (INCLUDING NOW)?: NO

## 2023-03-21 SDOH — ECONOMIC STABILITY: INCOME INSECURITY: HOW HARD IS IT FOR YOU TO PAY FOR THE VERY BASICS LIKE FOOD, HOUSING, MEDICAL CARE, AND HEATING?: NOT HARD AT ALL

## 2023-03-21 SDOH — ECONOMIC STABILITY: FOOD INSECURITY: WITHIN THE PAST 12 MONTHS, YOU WORRIED THAT YOUR FOOD WOULD RUN OUT BEFORE YOU GOT MONEY TO BUY MORE.: NEVER TRUE

## 2023-03-21 ASSESSMENT — ENCOUNTER SYMPTOMS
DIARRHEA: 0
CONSTIPATION: 0
SORE THROAT: 0
SHORTNESS OF BREATH: 0
COUGH: 0
RHINORRHEA: 0

## 2023-03-21 ASSESSMENT — PATIENT HEALTH QUESTIONNAIRE - PHQ9: DEPRESSION UNABLE TO ASSESS: FUNCTIONAL CAPACITY MOTIVATION LIMITS ACCURACY

## 2023-03-21 NOTE — PROGRESS NOTES
General: Bowel sounds are normal.      Palpations: Abdomen is soft. Tenderness: There is no abdominal tenderness. Musculoskeletal:      Right lower leg: No edema. Left lower leg: No edema. Skin:     General: Skin is warm. Findings: No erythema, lesion or rash. Neurological:      Mental Status: He is alert. Cranial Nerves: Dysarthria present. Motor: Weakness and abnormal muscle tone present. Gait: Gait abnormal (wheel chair dependant). Psychiatric:         Mood and Affect: Mood normal.         Behavior: Behavior is cooperative. PROCEDURE/ IN OFFICE TESTING/ LAB REVIEW     No in office testing or procedures completed during today's office visit. ASSESSMENT/PLAN/ FOLLOWUP:     PLEASE NOTE THAT ANY DISCONTINUATION OF MEDICATIONS OR MEDICAL SUPPLIES REFLECTED IN TODAY'S VISIT SUMMARY  MAY NOT HAVE COMPLETED AS A CHANGE IN YOUR PLAN OF CARE. THESE CHANGES MAY HAVE ONLY BEEN DONE SO IN ORDER TO CLEAN UP LIST FROM DUPLICATIONS OR MISCELLANEOUS SUPPLIES ONLY NEEDED PERIODIC REORDERS. DO NOT DISCONTINUE MEDICATIONS LISTED UNLESS SPECIFICALLY DISCUSSED IN YOUR APPOINTMENT WITH PROVIDER OR SPECIALIST, IF YOU HAVE AN QUESTIONS, PLEASE CONTACT YOUR PROVIDER FOR CLARIFICATION IF NOT ADDRESSED IN YOUR PLAN OF CARE. 1. Traumatic brain injury with loss of consciousness, sequela ([facility] [address]  -     Amb External Referral To Physical Therapy  -     External Referral To Occupational Therapy  2. Seizure disorder (HCC)  -     Levetiracetam Level; Future  -     Amb External Referral To Physical Therapy  -     External Referral To Occupational Therapy  3. Pure hypercholesterolemia  -     Lipid Panel; Future  -     CBC; Future  -     Comprehensive Metabolic Panel, Fasting; Future  4. IFG (impaired fasting glucose)  -     Hemoglobin A1C; Future  5. Other urinary incontinence  -     Misc.  Devices MISC; ONCE PRN Starting Tue 3/21/2023, Until Tue 3/21/2023 at 2359, For 1 dose, Disp-1 each, R-0,

## 2023-04-06 ENCOUNTER — TELEPHONE (OUTPATIENT)
Dept: FAMILY MEDICINE CLINIC | Age: 43
End: 2023-04-06

## 2023-04-06 DIAGNOSIS — G83.9: ICD-10-CM

## 2023-04-06 DIAGNOSIS — N39.498 OTHER URINARY INCONTINENCE: ICD-10-CM

## 2023-04-06 DIAGNOSIS — F07.81 POST TRAUMATIC ENCEPHALOPATHY: ICD-10-CM

## 2023-04-06 DIAGNOSIS — G40.909 SEIZURE DISORDER (HCC): Primary | ICD-10-CM

## 2023-04-06 NOTE — TELEPHONE ENCOUNTER
Writer called and spoke with [facility] to see if the patient is able to get the 20 x 23 under pads. The pharmacist stated the smallest they can get is 23 x 36. He said they will need a new order faxed to them before they can fill the script. New script is pended.

## 2023-05-01 DIAGNOSIS — G40.909 SEIZURE DISORDER (HCC): ICD-10-CM

## 2023-05-01 DIAGNOSIS — E78.00 PURE HYPERCHOLESTEROLEMIA: ICD-10-CM

## 2023-05-01 RX ORDER — ATORVASTATIN CALCIUM 20 MG/1
20 TABLET, FILM COATED ORAL DAILY
Qty: 31 TABLET | Refills: 5 | Status: SHIPPED | OUTPATIENT
Start: 2023-05-01

## 2023-05-01 NOTE — TELEPHONE ENCOUNTER
Pharmacy requesting refill of levETIRAcetam (KEPPRA) 250 MG tablet      Medication active on med list yes      Date of last Rx: 11/9/2022 with 5 refills          verified by PEPE CHOUDHURY      Date of last appointment 11/15/2021    Next Visit Date:  Visit date not found

## 2023-05-02 RX ORDER — LEVETIRACETAM 250 MG/1
TABLET ORAL
Qty: 60 TABLET | Refills: 5 | Status: SHIPPED | OUTPATIENT
Start: 2023-05-02

## 2023-05-09 ENCOUNTER — HOSPITAL ENCOUNTER (OUTPATIENT)
Age: 43
Setting detail: SPECIMEN
Discharge: HOME | End: 2023-05-09

## 2023-05-09 DIAGNOSIS — R73.01 IFG (IMPAIRED FASTING GLUCOSE): ICD-10-CM

## 2023-05-09 DIAGNOSIS — G40.909 SEIZURE DISORDER (HCC): ICD-10-CM

## 2023-05-09 DIAGNOSIS — E78.00 PURE HYPERCHOLESTEROLEMIA: ICD-10-CM

## 2023-05-09 LAB
ALBUMIN SERPL-MCNC: 4.4 G/DL (ref 3.5–5.2)
ALBUMIN/GLOBULIN RATIO: 1.6 (ref 1–2.5)
ALP SERPL-CCNC: 76 U/L (ref 40–129)
ALT SERPL-CCNC: 15 U/L (ref 5–41)
ANION GAP SERPL CALCULATED.3IONS-SCNC: 9 MMOL/L (ref 9–17)
AST SERPL-CCNC: 19 U/L
BILIRUB SERPL-MCNC: 0.6 MG/DL (ref 0.3–1.2)
BUN SERPL-MCNC: 11 MG/DL (ref 6–20)
CALCIUM SERPL-MCNC: 9.2 MG/DL (ref 8.6–10.4)
CHLORIDE SERPL-SCNC: 103 MMOL/L (ref 98–107)
CHOLEST SERPL-MCNC: 149 MG/DL
CHOLESTEROL/HDL RATIO: 3
CO2 SERPL-SCNC: 29 MMOL/L (ref 20–31)
CREAT SERPL-MCNC: 0.82 MG/DL (ref 0.7–1.2)
EST. AVERAGE GLUCOSE BLD GHB EST-MCNC: 105 MG/DL
GFR SERPL CREATININE-BSD FRML MDRD: >60 ML/MIN/1.73M2
GLUCOSE SERPL-MCNC: 76 MG/DL (ref 70–99)
HBA1C MFR BLD: 5.3 % (ref 4–6)
HCT VFR BLD AUTO: 46.6 % (ref 40.7–50.3)
HDLC SERPL-MCNC: 49 MG/DL
HGB BLD-MCNC: 15.4 G/DL (ref 13–17)
KEPPRA: 5 UG/ML
LDLC SERPL CALC-MCNC: 86 MG/DL (ref 0–130)
MCH RBC QN AUTO: 30.6 PG (ref 25.2–33.5)
MCHC RBC AUTO-ENTMCNC: 33 G/DL (ref 28.4–34.8)
MCV RBC AUTO: 92.5 FL (ref 82.6–102.9)
NRBC AUTOMATED: 0 PER 100 WBC
PDW BLD-RTO: 12.5 % (ref 11.8–14.4)
PLATELET # BLD AUTO: 196 K/UL (ref 138–453)
PMV BLD AUTO: 12.7 FL (ref 8.1–13.5)
POTASSIUM SERPL-SCNC: 4.8 MMOL/L (ref 3.7–5.3)
PROT SERPL-MCNC: 7.1 G/DL (ref 6.4–8.3)
RBC # BLD: 5.04 M/UL (ref 4.21–5.77)
SODIUM SERPL-SCNC: 141 MMOL/L (ref 135–144)
TRIGL SERPL-MCNC: 72 MG/DL
WBC # BLD AUTO: 4.7 K/UL (ref 3.5–11.3)

## 2023-06-08 DIAGNOSIS — R94.5 LIVER FUNCTION ABNORMALITY: ICD-10-CM

## 2023-06-08 DIAGNOSIS — E55.9 VITAMIN D DEFICIENCY: ICD-10-CM

## 2023-06-08 RX ORDER — FOLIC ACID 1 MG/1
1 TABLET ORAL DAILY
Qty: 90 TABLET | Refills: 1 | Status: SHIPPED | OUTPATIENT
Start: 2023-06-08

## 2023-06-08 RX ORDER — CHOLECALCIFEROL (VITAMIN D3) 125 MCG
1 CAPSULE ORAL DAILY
Qty: 90 TABLET | Refills: 1 | Status: SHIPPED | OUTPATIENT
Start: 2023-06-08

## 2023-09-25 NOTE — PROGRESS NOTES
Lab Results   Component Value Date    HGBA1C 5 5 07/21/2022     · Hold home metformin   · glucochecks ac and insulin coverage as needed Guidelines for fasting triglyceride, October 2012. ASSESSMENT & Floridalma Gonzales was seen today for established new doctor. Diagnoses and all orders for this visit:    Traumatic brain injury with loss of consciousness, subsequent encounter  -     Incontinence Supply Disposable (GUARDS) MISC; Apply BID, Please dispense 180 each Dx 780.33  -     Incontinence Supply Disposable (DEPEND GUARDS FOR MEN) MISC; Use As directed twice a day    Cold sore  -     penciclovir (DENAVIR) 1 % cream; Apply topically every 2 hours. Partial bilateral paralysis (HCC)  -     Incontinence Supply Disposable (GUARDS) MISC; Apply BID, Please dispense 180 each Dx 780.33  -     Incontinence Supply Disposable (DEPEND GUARDS FOR MEN) MISC; Use As directed twice a day    Wheelchair dependent  -     Incontinence Supply Disposable (GUARDS) MISC; Apply BID, Please dispense 180 each Dx 780.33  -     Incontinence Supply Disposable (DEPEND GUARDS FOR MEN) MISC; Use As directed twice a day    Developmental non-verbal disorder    Flu vaccine need  -     Influenza, FLUCELVAX, (age 10 mo+), IM, Preservative Free, 0.5 mL         PLAN:   Here to establish care  Flu shot given  Reordered incontinence supplies. Denevir cream PRN for cold sores.    Labs reviewed-no changes in medications  Return in November for annual exam.       Electronically signed by RICARDO Rodas CNP on 9/26/2023 at 2:17 PM

## 2023-09-26 ENCOUNTER — OFFICE VISIT (OUTPATIENT)
Dept: FAMILY MEDICINE CLINIC | Age: 43
End: 2023-09-26
Payer: MEDICARE

## 2023-09-26 VITALS
DIASTOLIC BLOOD PRESSURE: 60 MMHG | TEMPERATURE: 98.1 F | HEART RATE: 71 BPM | BODY MASS INDEX: 15.45 KG/M2 | HEIGHT: 72 IN | OXYGEN SATURATION: 99 % | SYSTOLIC BLOOD PRESSURE: 98 MMHG

## 2023-09-26 DIAGNOSIS — Z23 FLU VACCINE NEED: ICD-10-CM

## 2023-09-26 DIAGNOSIS — F81.89 DEVELOPMENTAL NON-VERBAL DISORDER: ICD-10-CM

## 2023-09-26 DIAGNOSIS — G83.9: ICD-10-CM

## 2023-09-26 DIAGNOSIS — S06.9X9D TRAUMATIC BRAIN INJURY WITH LOSS OF CONSCIOUSNESS, SUBSEQUENT ENCOUNTER: Primary | Chronic | ICD-10-CM

## 2023-09-26 DIAGNOSIS — Z99.3 WHEELCHAIR DEPENDENT: ICD-10-CM

## 2023-09-26 DIAGNOSIS — B00.1 COLD SORE: ICD-10-CM

## 2023-09-26 PROCEDURE — 90674 CCIIV4 VAC NO PRSV 0.5 ML IM: CPT

## 2023-09-26 PROCEDURE — 99213 OFFICE O/P EST LOW 20 MIN: CPT

## 2023-09-26 PROCEDURE — G0008 ADMIN INFLUENZA VIRUS VAC: HCPCS

## 2023-09-26 RX ORDER — ACYCLOVIR 400 MG/1
400 TABLET ORAL 2 TIMES DAILY PRN
Qty: 62 TABLET | Refills: 5 | Status: CANCELLED | OUTPATIENT
Start: 2023-09-26

## 2023-09-26 RX ORDER — PENCICLOVIR 10 MG/G
CREAM TOPICAL
Qty: 1.5 G | Refills: 1 | Status: SHIPPED | OUTPATIENT
Start: 2023-09-26 | End: 2023-09-30

## 2023-09-26 RX ORDER — DIAPER,BRIEF,ADULT, DISPOSABLE
EACH MISCELLANEOUS
Qty: 180 EACH | Refills: 11 | Status: SHIPPED | OUTPATIENT
Start: 2023-09-26

## 2023-09-26 RX ORDER — ETOH/EUC OIL/MENTH/PEP/WINTERG
SPRAY, NON-AEROSOL (ML) MUCOUS MEMBRANE
Qty: 180 EACH | Refills: 0 | Status: SHIPPED | OUTPATIENT
Start: 2023-09-26 | End: 2024-09-26

## 2023-09-26 ASSESSMENT — ENCOUNTER SYMPTOMS
CONSTIPATION: 0
DIARRHEA: 0
SHORTNESS OF BREATH: 0
COLOR CHANGE: 0
SINUS PAIN: 0
WHEEZING: 0

## 2023-09-26 ASSESSMENT — PATIENT HEALTH QUESTIONNAIRE - PHQ9: DEPRESSION UNABLE TO ASSESS: FUNCTIONAL CAPACITY MOTIVATION LIMITS ACCURACY

## 2023-10-10 DIAGNOSIS — E78.00 PURE HYPERCHOLESTEROLEMIA: ICD-10-CM

## 2023-10-10 DIAGNOSIS — G83.9: ICD-10-CM

## 2023-10-10 DIAGNOSIS — R94.5 LIVER FUNCTION ABNORMALITY: ICD-10-CM

## 2023-10-10 DIAGNOSIS — G40.909 SEIZURE DISORDER (HCC): ICD-10-CM

## 2023-10-10 DIAGNOSIS — Z99.3 WHEELCHAIR DEPENDENT: ICD-10-CM

## 2023-10-10 DIAGNOSIS — S06.9X9D TRAUMATIC BRAIN INJURY WITH LOSS OF CONSCIOUSNESS, SUBSEQUENT ENCOUNTER: Chronic | ICD-10-CM

## 2023-10-10 DIAGNOSIS — E55.9 VITAMIN D DEFICIENCY: ICD-10-CM

## 2023-10-10 RX ORDER — FESOTERODINE FUMARATE 8 MG/1
TABLET, FILM COATED, EXTENDED RELEASE ORAL
Qty: 30 TABLET | Refills: 0 | OUTPATIENT
Start: 2023-10-10

## 2023-10-10 RX ORDER — DIAPER,BRIEF,ADULT, DISPOSABLE
EACH MISCELLANEOUS
Refills: 0 | OUTPATIENT
Start: 2023-10-10

## 2023-10-10 NOTE — TELEPHONE ENCOUNTER
Pharmacy requesting refill of Keppra 250 mg.      Medication active on med list yes      Date of last Rx: 05/02/23  with 5 refills          verified by PEPE GRAHAM      Date of last appointment 11/15/21    Next Visit Date: None, message left to schedule follow up appointment.

## 2023-10-11 RX ORDER — CHOLECALCIFEROL (VITAMIN D3) 125 MCG
1 CAPSULE ORAL DAILY
Qty: 90 TABLET | Refills: 1 | Status: SHIPPED | OUTPATIENT
Start: 2023-10-11

## 2023-10-11 RX ORDER — ATORVASTATIN CALCIUM 20 MG/1
20 TABLET, FILM COATED ORAL DAILY
Qty: 31 TABLET | Refills: 5 | Status: SHIPPED | OUTPATIENT
Start: 2023-10-11

## 2023-10-11 RX ORDER — FESOTERODINE FUMARATE 8 MG/1
1 TABLET, EXTENDED RELEASE ORAL NIGHTLY PRN
Qty: 30 TABLET | Refills: 5 | Status: SHIPPED | OUTPATIENT
Start: 2023-10-11

## 2023-10-11 RX ORDER — FOLIC ACID 1 MG/1
1 TABLET ORAL DAILY
Qty: 90 TABLET | Refills: 1 | Status: SHIPPED | OUTPATIENT
Start: 2023-10-11

## 2023-10-11 RX ORDER — LEVETIRACETAM 250 MG/1
TABLET ORAL
Qty: 60 TABLET | Refills: 0 | Status: SHIPPED | OUTPATIENT
Start: 2023-10-11

## 2023-10-11 NOTE — TELEPHONE ENCOUNTER
LOV 9/26/2023     RTO 83/4/1108   LRF folic acid, Cholecalciferol 6/8/2023 Fesoterodine Fumarate ER 3/14/2023   atorvastatin 5/1/2023        Controlled Substance Monitoring:    Acute and Chronic Pain Monitoring:        No data to display

## 2023-11-20 DIAGNOSIS — K59.04 CHRONIC IDIOPATHIC CONSTIPATION: ICD-10-CM

## 2023-11-20 RX ORDER — POLYETHYLENE GLYCOL 3350 17 G/17G
POWDER, FOR SOLUTION ORAL
Qty: 119 G | Refills: 0 | Status: SHIPPED | OUTPATIENT
Start: 2023-11-20

## 2023-11-20 NOTE — TELEPHONE ENCOUNTER
LOV 9/26/2023   RTO 11/29/2023  LRF 11/9/2022          Controlled Substance Monitoring:    Acute and Chronic Pain Monitoring:        No data to display

## 2023-11-27 ASSESSMENT — PATIENT HEALTH QUESTIONNAIRE - PHQ9
SUM OF ALL RESPONSES TO PHQ QUESTIONS 1-9: 0
SUM OF ALL RESPONSES TO PHQ QUESTIONS 1-9: 0
2. FEELING DOWN, DEPRESSED OR HOPELESS: 0
1. LITTLE INTEREST OR PLEASURE IN DOING THINGS: NOT AT ALL
SUM OF ALL RESPONSES TO PHQ9 QUESTIONS 1 & 2: 0
2. FEELING DOWN, DEPRESSED OR HOPELESS: NOT AT ALL
1. LITTLE INTEREST OR PLEASURE IN DOING THINGS: 0
SUM OF ALL RESPONSES TO PHQ9 QUESTIONS 1 & 2: 0
SUM OF ALL RESPONSES TO PHQ QUESTIONS 1-9: 0
SUM OF ALL RESPONSES TO PHQ QUESTIONS 1-9: 0

## 2023-11-29 ENCOUNTER — OFFICE VISIT (OUTPATIENT)
Dept: FAMILY MEDICINE CLINIC | Age: 43
End: 2023-11-29
Payer: MEDICARE

## 2023-11-29 VITALS
DIASTOLIC BLOOD PRESSURE: 60 MMHG | TEMPERATURE: 97.5 F | OXYGEN SATURATION: 99 % | HEART RATE: 66 BPM | SYSTOLIC BLOOD PRESSURE: 98 MMHG

## 2023-11-29 DIAGNOSIS — S06.9X9D TRAUMATIC BRAIN INJURY WITH LOSS OF CONSCIOUSNESS, SUBSEQUENT ENCOUNTER: Chronic | ICD-10-CM

## 2023-11-29 DIAGNOSIS — Z99.3 WHEELCHAIR DEPENDENT: ICD-10-CM

## 2023-11-29 DIAGNOSIS — G83.9: ICD-10-CM

## 2023-11-29 DIAGNOSIS — Z00.00 ANNUAL PHYSICAL EXAM: Primary | ICD-10-CM

## 2023-11-29 DIAGNOSIS — K59.04 CHRONIC IDIOPATHIC CONSTIPATION: ICD-10-CM

## 2023-11-29 PROCEDURE — 99396 PREV VISIT EST AGE 40-64: CPT

## 2023-11-29 PROCEDURE — G8482 FLU IMMUNIZE ORDER/ADMIN: HCPCS

## 2023-11-29 RX ORDER — POLYETHYLENE GLYCOL 3350 17 G/17G
POWDER, FOR SOLUTION ORAL
Qty: 119 G | Refills: 0 | Status: SHIPPED | OUTPATIENT
Start: 2023-11-29

## 2023-11-29 RX ORDER — DIAPER,BRIEF,ADULT, DISPOSABLE
EACH MISCELLANEOUS
Qty: 180 EACH | Refills: 11 | Status: CANCELLED | OUTPATIENT
Start: 2023-11-29

## 2023-11-29 ASSESSMENT — ENCOUNTER SYMPTOMS
SHORTNESS OF BREATH: 0
DIARRHEA: 0
CONSTIPATION: 1
WHEEZING: 0

## 2023-11-29 NOTE — PROGRESS NOTES
Minimal verbal communication            RESULTS:      No results found for this visit on 11/29/23. No visits with results within 6 Month(s) from this visit. Latest known visit with results is:   Hospital Outpatient Visit on 05/09/2023   Component Date Value Ref Range Status    Levetiracetam Lvl 05/09/2023 5  ug/mL Final    Comment:       A reference range for Keppra has not been well established. The proposed therapeutic range   for seizure control is 6-46 ug/mL. Measurement of Levetiracetam (Keppra) can be elevated due to the presence of both Keppra and   Brivaracetam (Briviact) in the patient's system. The medications are structurally similar thus cross reactivity is possible. Pharmacokinetics of Keppra are affected by renal function. The relationship between serum concentrations and toxicity is not known. Hemoglobin A1C 05/09/2023 5.3  4.0 - 6.0 % Final    Estimated Avg Glucose 05/09/2023 105  mg/dL Final    Comment: The ADA and AACC recommend providing the estimated average glucose result to permit better   patient understanding of their HBA1c result. Glucose, Fasting 05/09/2023 76  70 - 99 mg/dL Final    BUN 05/09/2023 11  6 - 20 mg/dL Final    Creatinine 05/09/2023 0.82  0.70 - 1.20 mg/dL Final    Est, Glom Filt Rate 05/09/2023 >60  >60 mL/min/1.73m2 Final    Comment:       These results are not intended for use in patients <25years of age. eGFR results are calculated without a race factor using the 2021 CKD-EPI equation. Careful clinical correlation is recommended, particularly when comparing to results   calculated using previous equations. The CKD-EPI equation is less accurate in patients with extremes of muscle mass, extra-renal   metabolism of creatine, excessive creatine ingestion, or following therapy that affects   renal tubular secretion.       Calcium 05/09/2023 9.2  8.6 - 10.4 mg/dL Final    Sodium 05/09/2023 141  135 - 144 mmol/L Final    Potassium 05/09/2023 4.8  3.7 -

## 2023-12-11 DIAGNOSIS — G40.909 SEIZURE DISORDER (HCC): ICD-10-CM

## 2023-12-12 RX ORDER — LEVETIRACETAM 250 MG/1
TABLET ORAL
Qty: 60 TABLET | Refills: 0 | Status: SHIPPED | OUTPATIENT
Start: 2023-12-12

## 2023-12-12 NOTE — TELEPHONE ENCOUNTER
Pharmacy requesting refill of levETIRAcetam (KEPPRA) 250 MG tablet    Medication active on med list yes      Date of last Rx: 10/11/2023 with 0 refills          verified by PEPE CHOUDHURY      Date of last appointment 11/15/2021    Next Visit Date:  1/15/2024

## 2024-01-25 DIAGNOSIS — K59.04 CHRONIC IDIOPATHIC CONSTIPATION: ICD-10-CM

## 2024-01-25 DIAGNOSIS — G40.909 SEIZURE DISORDER (HCC): ICD-10-CM

## 2024-01-25 RX ORDER — LEVETIRACETAM 250 MG/1
TABLET ORAL
Qty: 60 TABLET | Refills: 2 | Status: SHIPPED | OUTPATIENT
Start: 2024-01-25

## 2024-01-25 RX ORDER — POLYETHYLENE GLYCOL 3350 17 G/17G
POWDER, FOR SOLUTION ORAL
Qty: 119 G | Refills: 0 | Status: SHIPPED | OUTPATIENT
Start: 2024-01-25

## 2024-01-25 NOTE — TELEPHONE ENCOUNTER
Patient was scheduled to have an appointment with Dr. Godfrey on 1/15/2024, but this was cancelled by our office. Patient has not been seen since 11/15/2021, and this was discussed with Milka. OK per Milka to give the refill.     Pharmacy requesting refill of Keppra 250mg.      Medication active on med list yes      Date of last Rx: 12/12/2023 with 0 refills          verified by CHUY FLORES      Date of last appointment 11/15/2021    Next Visit Date:  3/13/2024

## 2024-01-25 NOTE — TELEPHONE ENCOUNTER
LOV 11/29/23 physical    RTO n/a  LRF 11/29/23          Controlled Substance Monitoring:    Acute and Chronic Pain Monitoring:        No data to display

## 2024-02-13 DIAGNOSIS — K59.04 CHRONIC IDIOPATHIC CONSTIPATION: ICD-10-CM

## 2024-02-14 RX ORDER — POLYETHYLENE GLYCOL 3350 17 G/17G
POWDER, FOR SOLUTION ORAL
Qty: 119 G | Refills: 0 | Status: SHIPPED | OUTPATIENT
Start: 2024-02-14

## 2024-02-14 NOTE — TELEPHONE ENCOUNTER
LOV 11/29/23   RTO in Nov  LRF 1/25/24          Controlled Substance Monitoring:    Acute and Chronic Pain Monitoring:        No data to display

## 2024-03-13 ENCOUNTER — TELEMEDICINE (OUTPATIENT)
Dept: NEUROLOGY | Age: 44
End: 2024-03-13
Payer: MEDICARE

## 2024-03-13 DIAGNOSIS — G40.909 SEIZURE DISORDER (HCC): ICD-10-CM

## 2024-03-13 DIAGNOSIS — S06.9X0S TRAUMATIC BRAIN INJURY, WITHOUT LOSS OF CONSCIOUSNESS, SEQUELA (HCC): Primary | ICD-10-CM

## 2024-03-13 PROCEDURE — G8482 FLU IMMUNIZE ORDER/ADMIN: HCPCS | Performed by: PSYCHIATRY & NEUROLOGY

## 2024-03-13 PROCEDURE — G8419 CALC BMI OUT NRM PARAM NOF/U: HCPCS | Performed by: PSYCHIATRY & NEUROLOGY

## 2024-03-13 PROCEDURE — 1036F TOBACCO NON-USER: CPT | Performed by: PSYCHIATRY & NEUROLOGY

## 2024-03-13 PROCEDURE — G8427 DOCREV CUR MEDS BY ELIG CLIN: HCPCS | Performed by: PSYCHIATRY & NEUROLOGY

## 2024-03-13 PROCEDURE — 99214 OFFICE O/P EST MOD 30 MIN: CPT | Performed by: PSYCHIATRY & NEUROLOGY

## 2024-03-13 RX ORDER — LEVETIRACETAM 250 MG/1
TABLET ORAL
Qty: 60 TABLET | Refills: 11 | Status: SHIPPED | OUTPATIENT
Start: 2024-03-13

## 2024-03-13 ASSESSMENT — ENCOUNTER SYMPTOMS
GASTROINTESTINAL NEGATIVE: 1
EYES NEGATIVE: 1
RESPIRATORY NEGATIVE: 1

## 2024-03-13 NOTE — PROGRESS NOTES
temporalis . Intact corneal reflex. Normal facial sensation  Cranial nerve 7 normal exam   Cranial nerve 8. Grossly intact hearing   Cranial nerve 9 and 10. Symmetric palate elevation   Cranial nerve 11 , 5 out of 5 strength   Cranial Nerve 12 midline tongue . No atrophy  Sensation .Normal proprioception . Intact Vibration . Normal pinprick and light touch   Deep Tendon Reflexes brisk   Plantar response extensor bilaterally    Assessment:      1. Traumatic brain injury, without loss of consciousness, sequela (HCC)    2. Seizure disorder (HCC)    Mother would like to take him off keppra to undergo first EEG       Plan:      Orders Placed This Encounter   Procedures    EEG     Standing Status:   Future     Standing Expiration Date:   3/13/2025

## 2024-03-14 DIAGNOSIS — K59.04 CHRONIC IDIOPATHIC CONSTIPATION: ICD-10-CM

## 2024-03-14 RX ORDER — POLYETHYLENE GLYCOL 3350 17 G/17G
POWDER, FOR SOLUTION ORAL
Qty: 119 G | Refills: 3 | Status: SHIPPED | OUTPATIENT
Start: 2024-03-14

## 2024-03-14 NOTE — TELEPHONE ENCOUNTER
LOV 11/29/23   RTO 6/24/24  LRF 2/14/24          Controlled Substance Monitoring:    Acute and Chronic Pain Monitoring:        No data to display

## 2024-03-21 ENCOUNTER — HOSPITAL ENCOUNTER (OUTPATIENT)
Dept: NEUROLOGY | Age: 44
Discharge: HOME | End: 2024-03-21
Payer: MEDICARE

## 2024-03-21 DIAGNOSIS — G40.909 SEIZURE DISORDER (HCC): ICD-10-CM

## 2024-03-21 PROCEDURE — 95816 EEG AWAKE AND DROWSY: CPT

## 2024-03-21 NOTE — PROCEDURES
EEG REPORT     CLINICAL NEUROPHYSIOLOGY LABORATORY  DEPARTMENT OF NEUROLOGY  [facility]       Patient: Evelio Emery Age: 43 y.o.  MRN: [id number]      Referring Provider: Bob Godfrey MD    History: This routine 30 minute scalp EEG was recorded with video- monitoring for a 43 y.o.. male  who presented with encephalopathy. This EEG was performed to evaluate for focal and epileptiform abnormalities.     Evelio Emery   Current Outpatient Medications   Medication Sig Dispense Refill    polyethylene glycol (GLYCOLAX) 17 GM/SCOOP powder TAKE 3/4 CAPFUL MIXED WITH 8 OZ. LIQUID ON SUNDAY, TUESDAY, WEDNESDAY & THURSDAY MORNINGS. MAY HOLD DOSE IF HAS DIARRHEA (NOTIFY NURSE) 119 g 3    levETIRAcetam (KEPPRA) 250 MG tablet Take 1 tablet by mouth twice a day every 12 hours 60 tablet 11    Fesoterodine Fumarate ER (TOVIAZ) 8 MG TB24 Take 1 tablet by mouth nightly as needed (urinary incontinence) 30 tablet 5    atorvastatin (LIPITOR) 20 MG tablet Take 1 tablet by mouth daily 31 tablet 5    folic acid (FOLVITE) 1 MG tablet Take 1 tablet by mouth daily 90 tablet 1    Cholecalciferol (VITAMIN D3) 50 MCG (2000 UT) TABS Take 1 tablet by mouth daily 90 tablet 1    Incontinence Supply Disposable (GUARDS) MISC Apply BID, Please dispense 180 each Dx 780.33 180 each 0    Incontinence Supply Disposable (DEPEND GUARDS FOR MEN) MISC Use As directed twice a day 180 each 11    Misc. Devices MISC 1 each by Does not apply route once as needed (incontinence) Depends Night Defense size small medium. Brief to be changed every 4 hours - Total 180 per month. 1 each 0    acyclovir (ZOVIRAX) 400 MG tablet Take 1 tablet by mouth 2 times daily (Patient not taking: Reported on 3/13/2024) 62 tablet 5    nystatin (MYCOSTATIN) 543656 UNIT/GM powder Apply 3 times daily. 1 each 1    VALU-DRYL ALLERGY 12.5 MG/5ML liquid TAKE 10ML BY MOUTH FOUR TIMES A DAY AS NEEDED (ITCHING OR ALLERGIES) 180 mL 0     No current facility-administered medications for this

## 2024-04-01 ENCOUNTER — TELEPHONE (OUTPATIENT)
Dept: NEUROLOGY | Age: 44
End: 2024-04-01

## 2024-04-01 NOTE — TELEPHONE ENCOUNTER
Call placed to mother Gavin his information was give.  Mother verbally stated her understanding.  Mother confirmed that this information would need to be sent to [facility] as well.  Call placed to [facility] and writer was transferred to nurse [place].  She asked that we fax the order to [phone].  Order successfully faxed.

## 2024-04-01 NOTE — TELEPHONE ENCOUNTER
----- Message from Bob Godfrey MD sent at 3/26/2024  1:31 PM EDT -----  Message to nurse .Let mother know EEG is normal . Have him cut keppra 250 mg po qhs for one week he stop . He lives in group home . Order may  need to sent to group home directly . Let mom know

## 2024-04-01 NOTE — TELEPHONE ENCOUNTER
Zabrina Humphrey LPN  3/27/2024  4:55 PM EDT Back to Top      Call placed to mother Mary and a message left on her VM asking for a return call.      Bob Godfrey MD  3/26/2024  1:31 PM EDT       Message to nurse .Let mother know EEG is normal . Have him cut keppra 250 mg po qhs for one week he stop . He lives in group home . Order may  need to sent to group home directly . Let mom know

## 2024-04-15 RX ORDER — FESOTERODINE FUMARATE 8 MG/1
8 TABLET, EXTENDED RELEASE ORAL
Qty: 30 TABLET | Refills: 5 | Status: SHIPPED | OUTPATIENT
Start: 2024-04-15

## 2024-04-15 NOTE — TELEPHONE ENCOUNTER
LOV 11/29/23   RTO yearly  LRF 10/11/23          Controlled Substance Monitoring:    Acute and Chronic Pain Monitoring:        No data to display

## 2024-05-07 DIAGNOSIS — E78.00 PURE HYPERCHOLESTEROLEMIA: ICD-10-CM

## 2024-05-07 DIAGNOSIS — E55.9 VITAMIN D DEFICIENCY: ICD-10-CM

## 2024-05-07 DIAGNOSIS — R94.5 LIVER FUNCTION ABNORMALITY: ICD-10-CM

## 2024-05-07 RX ORDER — ATORVASTATIN CALCIUM 20 MG/1
TABLET, FILM COATED ORAL
Qty: 30 TABLET | Refills: 4 | Status: SHIPPED | OUTPATIENT
Start: 2024-05-07

## 2024-05-07 RX ORDER — FOLIC ACID 1 MG/1
TABLET ORAL
Qty: 30 TABLET | Refills: 4 | Status: SHIPPED | OUTPATIENT
Start: 2024-05-07

## 2024-05-07 RX ORDER — CHOLECALCIFEROL (VITAMIN D3) 125 MCG
CAPSULE ORAL
Qty: 30 TABLET | Refills: 4 | Status: SHIPPED | OUTPATIENT
Start: 2024-05-07

## 2024-05-07 NOTE — TELEPHONE ENCOUNTER
LOV 11/29/23   RTO n/a  LRF 10/11/23          Controlled Substance Monitoring:    Acute and Chronic Pain Monitoring:        No data to display

## 2024-06-24 ENCOUNTER — TELEMEDICINE (OUTPATIENT)
Dept: NEUROLOGY | Age: 44
End: 2024-06-24
Payer: MEDICARE

## 2024-06-24 DIAGNOSIS — S06.9X0S TRAUMATIC BRAIN INJURY, WITHOUT LOSS OF CONSCIOUSNESS, SEQUELA (HCC): ICD-10-CM

## 2024-06-24 DIAGNOSIS — G40.909 SEIZURE DISORDER (HCC): Primary | ICD-10-CM

## 2024-06-24 PROCEDURE — G8428 CUR MEDS NOT DOCUMENT: HCPCS | Performed by: PSYCHIATRY & NEUROLOGY

## 2024-06-24 PROCEDURE — 1036F TOBACCO NON-USER: CPT | Performed by: PSYCHIATRY & NEUROLOGY

## 2024-06-24 PROCEDURE — G8419 CALC BMI OUT NRM PARAM NOF/U: HCPCS | Performed by: PSYCHIATRY & NEUROLOGY

## 2024-06-24 PROCEDURE — 99214 OFFICE O/P EST MOD 30 MIN: CPT | Performed by: PSYCHIATRY & NEUROLOGY

## 2024-06-24 NOTE — PROGRESS NOTES
Subjective:      Patient ID: Evelio Emery is a 43 y.o. male.    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 Patient with baseline cognitive dysfunction , unsteady gait . 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 The condition is EEG is normal . He is off keppra being more mentally more alert being more interactive drooling dressed  . There is simple verbal response is an appartment with 24 hour care . He is with assistance mainly on transfer being for larger part in wheelchair . He may have occasional acting out when not wanting to do something. There is no paranoia or delusions . There is mild daytime sleepiness  . He remains wheelchair bound with baseline ataxia.  Neck dystonia is gone . Significant medications .  He has been on keppra , provigil, neurontin, lyrica, cymbalta , seroquel, risperdal , elavil , atarax , zyprexa , depakote and requip in the past . Testing Head CT with encephalomalacia left frontal parietal and right parietal occipital with bilateral frontal craniotomies , February 2007. LTME no seizure activity seen, Polysomnogram apnea hypopnea index 6 episodes per hour. MSLT mean sleep latency 9.5 minutes with 2 out of 4 REM onset periods. MRI of Head with left frontal and temporal along with posterior right temporal and right inferior cerebellar encephalomalacia , June 2014      Past Medical History:   Diagnosis Date    Adjustment disorder     Dementia (HCC)     Dystonia     Encephalopathy     Encephalopathy, unspecified     Esophageal reflux     Hypercholesteremia     Hyperlipidemia     Localization-related (focal) (partial) epilepsy and

## 2024-07-08 DIAGNOSIS — K59.04 CHRONIC IDIOPATHIC CONSTIPATION: ICD-10-CM

## 2024-07-08 NOTE — TELEPHONE ENCOUNTER
LOV 11/29/2023   RTO 12/3/24  LRF 3/14/24    Received notice from [facility] that they did not receive full electronic refill. Pended.           Controlled Substance Monitoring:    Acute and Chronic Pain Monitoring:        No data to display

## 2024-07-10 RX ORDER — POLYETHYLENE GLYCOL 3350 17 G/17G
POWDER, FOR SOLUTION ORAL
Qty: 119 G | Refills: 3 | Status: SHIPPED | OUTPATIENT
Start: 2024-07-10

## 2024-07-25 DIAGNOSIS — G83.9: ICD-10-CM

## 2024-07-25 DIAGNOSIS — S06.9X9D TRAUMATIC BRAIN INJURY WITH LOSS OF CONSCIOUSNESS, SUBSEQUENT ENCOUNTER: Chronic | ICD-10-CM

## 2024-07-25 DIAGNOSIS — Z99.3 WHEELCHAIR DEPENDENT: ICD-10-CM

## 2024-07-25 NOTE — TELEPHONE ENCOUNTER
LOV 11/29/23   RTO 12/3/24  LRF 9/26/23          Controlled Substance Monitoring:    Acute and Chronic Pain Monitoring:        No data to display

## 2024-07-26 RX ORDER — DIAPER,BRIEF,ADULT, DISPOSABLE
EACH MISCELLANEOUS
Qty: 90 EACH | Refills: 0 | Status: SHIPPED | OUTPATIENT
Start: 2024-07-26

## 2024-08-13 DIAGNOSIS — G83.9: ICD-10-CM

## 2024-08-13 DIAGNOSIS — Z99.3 WHEELCHAIR DEPENDENT: ICD-10-CM

## 2024-08-13 DIAGNOSIS — S06.9X9D TRAUMATIC BRAIN INJURY WITH LOSS OF CONSCIOUSNESS, SUBSEQUENT ENCOUNTER: Chronic | ICD-10-CM

## 2024-08-13 RX ORDER — DIAPER,BRIEF,ADULT, DISPOSABLE
EACH MISCELLANEOUS
Qty: 180 EACH | Refills: 11 | Status: SHIPPED | OUTPATIENT
Start: 2024-08-13

## 2024-08-13 RX ORDER — DIAPER,BRIEF,ADULT, DISPOSABLE
EACH MISCELLANEOUS
Qty: 90 EACH | Refills: 3 | Status: SHIPPED | OUTPATIENT
Start: 2024-08-13

## 2024-08-13 NOTE — TELEPHONE ENCOUNTER
LOV 11/29/23   RTO 12/3/24  LRF 7/26/24, 9/26/23          Controlled Substance Monitoring:    Acute and Chronic Pain Monitoring:        No data to display

## 2024-08-14 DIAGNOSIS — N39.498 OTHER URINARY INCONTINENCE: ICD-10-CM

## 2024-08-14 NOTE — TELEPHONE ENCOUNTER
Zach from [facility] called to verify order   Underpad ( For laying on) 100 with 3 refills   Guard Liners 180 with 11 refills   There was no order for briefs   Verbal was giving for  Underpad's - guard liners 8/14/24

## 2024-10-07 DIAGNOSIS — E78.00 PURE HYPERCHOLESTEROLEMIA: ICD-10-CM

## 2024-10-07 DIAGNOSIS — R94.5 LIVER FUNCTION ABNORMALITY: ICD-10-CM

## 2024-10-07 DIAGNOSIS — E55.9 VITAMIN D DEFICIENCY: ICD-10-CM

## 2024-10-08 RX ORDER — FOLIC ACID 1 MG/1
TABLET ORAL
Qty: 31 TABLET | Refills: 0 | Status: SHIPPED | OUTPATIENT
Start: 2024-10-08

## 2024-10-08 RX ORDER — CHOLECALCIFEROL (VITAMIN D3) 50 MCG
TABLET ORAL
Qty: 31 TABLET | Refills: 0 | Status: SHIPPED | OUTPATIENT
Start: 2024-10-08

## 2024-10-08 RX ORDER — ATORVASTATIN CALCIUM 20 MG/1
TABLET, FILM COATED ORAL
Qty: 31 TABLET | Refills: 0 | Status: SHIPPED | OUTPATIENT
Start: 2024-10-08

## 2024-10-08 NOTE — TELEPHONE ENCOUNTER
LOV 11/29/23   RTO 12/3/24  LRF 5/7/24          Controlled Substance Monitoring:    Acute and Chronic Pain Monitoring:        No data to display

## 2024-11-05 DIAGNOSIS — R94.5 LIVER FUNCTION ABNORMALITY: ICD-10-CM

## 2024-11-05 DIAGNOSIS — E55.9 VITAMIN D DEFICIENCY: ICD-10-CM

## 2024-11-05 DIAGNOSIS — E78.00 PURE HYPERCHOLESTEROLEMIA: ICD-10-CM

## 2024-11-05 DIAGNOSIS — K59.04 CHRONIC IDIOPATHIC CONSTIPATION: ICD-10-CM

## 2024-11-05 RX ORDER — POLYETHYLENE GLYCOL 3350 17 G/17G
POWDER, FOR SOLUTION ORAL
Qty: 119 G | Refills: 4 | Status: SHIPPED | OUTPATIENT
Start: 2024-11-05

## 2024-11-05 RX ORDER — FOLIC ACID 1 MG/1
TABLET ORAL
Qty: 31 TABLET | Refills: 3 | Status: SHIPPED | OUTPATIENT
Start: 2024-11-05

## 2024-11-05 RX ORDER — CHOLECALCIFEROL (VITAMIN D3) 50 MCG
TABLET ORAL
Qty: 31 TABLET | Refills: 3 | Status: SHIPPED | OUTPATIENT
Start: 2024-11-05

## 2024-11-05 RX ORDER — ATORVASTATIN CALCIUM 20 MG/1
TABLET, FILM COATED ORAL
Qty: 31 TABLET | Refills: 3 | Status: SHIPPED | OUTPATIENT
Start: 2024-11-05

## 2024-11-05 NOTE — TELEPHONE ENCOUNTER
LOV 11/29/23   RTO 1/21/25  LRF 10/8/24,           Controlled Substance Monitoring:    Acute and Chronic Pain Monitoring:        No data to display

## 2024-11-05 NOTE — TELEPHONE ENCOUNTER
LOV 11/29/23   RTO 1/21/25  LRF 7/10/24          Controlled Substance Monitoring:    Acute and Chronic Pain Monitoring:        No data to display

## 2025-01-19 SDOH — HEALTH STABILITY: PHYSICAL HEALTH: ON AVERAGE, HOW MANY DAYS PER WEEK DO YOU ENGAGE IN MODERATE TO STRENUOUS EXERCISE (LIKE A BRISK WALK)?: 0 DAYS

## 2025-01-21 ENCOUNTER — OFFICE VISIT (OUTPATIENT)
Dept: FAMILY MEDICINE CLINIC | Age: 45
End: 2025-01-21

## 2025-01-21 VITALS
WEIGHT: 118.6 LBS | HEART RATE: 64 BPM | BODY MASS INDEX: 16.06 KG/M2 | HEIGHT: 72 IN | SYSTOLIC BLOOD PRESSURE: 98 MMHG | TEMPERATURE: 96.5 F | DIASTOLIC BLOOD PRESSURE: 62 MMHG

## 2025-01-21 DIAGNOSIS — S06.9XAS TRAUMATIC BRAIN INJURY, WITH UNKNOWN LOSS OF CONSCIOUSNESS STATUS, SEQUELA: ICD-10-CM

## 2025-01-21 DIAGNOSIS — Z13.29 SCREENING FOR THYROID DISORDER: ICD-10-CM

## 2025-01-21 DIAGNOSIS — Z13.6 ENCOUNTER FOR LIPID SCREENING FOR CARDIOVASCULAR DISEASE: ICD-10-CM

## 2025-01-21 DIAGNOSIS — Z13.21 ENCOUNTER FOR VITAMIN DEFICIENCY SCREENING: ICD-10-CM

## 2025-01-21 DIAGNOSIS — N39.498 OTHER URINARY INCONTINENCE: ICD-10-CM

## 2025-01-21 DIAGNOSIS — Z13.220 ENCOUNTER FOR LIPID SCREENING FOR CARDIOVASCULAR DISEASE: ICD-10-CM

## 2025-01-21 DIAGNOSIS — E55.9 VITAMIN D DEFICIENCY: ICD-10-CM

## 2025-01-21 DIAGNOSIS — Z99.3 WHEELCHAIR DEPENDENT: ICD-10-CM

## 2025-01-21 DIAGNOSIS — G83.9: ICD-10-CM

## 2025-01-21 DIAGNOSIS — Z00.00 ANNUAL PHYSICAL EXAM: Primary | ICD-10-CM

## 2025-01-21 DIAGNOSIS — G40.909 SEIZURE DISORDER (HCC): ICD-10-CM

## 2025-01-21 DIAGNOSIS — Z13.1 SCREENING FOR DIABETES MELLITUS: ICD-10-CM

## 2025-01-21 DIAGNOSIS — F81.89 DEVELOPMENTAL NON-VERBAL DISORDER: ICD-10-CM

## 2025-01-21 DIAGNOSIS — K59.04 CHRONIC IDIOPATHIC CONSTIPATION: ICD-10-CM

## 2025-01-21 DIAGNOSIS — R73.01 IFG (IMPAIRED FASTING GLUCOSE): ICD-10-CM

## 2025-01-21 DIAGNOSIS — Z13.0 SCREENING FOR DEFICIENCY ANEMIA: ICD-10-CM

## 2025-01-21 DIAGNOSIS — B00.1 COLD SORE: ICD-10-CM

## 2025-01-21 DIAGNOSIS — Z23 NEEDS FLU SHOT: ICD-10-CM

## 2025-01-21 DIAGNOSIS — R63.6 UNDERWEIGHT (BMI < 18.5): ICD-10-CM

## 2025-01-21 RX ORDER — ACYCLOVIR 50 MG/G
OINTMENT TOPICAL
Qty: 1 EACH | Refills: 1 | Status: SHIPPED | OUTPATIENT
Start: 2025-01-21 | End: 2025-01-25 | Stop reason: SDUPTHER

## 2025-01-21 SDOH — ECONOMIC STABILITY: FOOD INSECURITY
WITHIN THE PAST 12 MONTHS, YOU WORRIED THAT YOUR FOOD WOULD RUN OUT BEFORE YOU GOT MONEY TO BUY MORE.: PATIENT UNABLE TO ANSWER

## 2025-01-21 SDOH — ECONOMIC STABILITY: FOOD INSECURITY
WITHIN THE PAST 12 MONTHS, THE FOOD YOU BOUGHT JUST DIDN'T LAST AND YOU DIDN'T HAVE MONEY TO GET MORE.: PATIENT UNABLE TO ANSWER

## 2025-01-21 ASSESSMENT — ENCOUNTER SYMPTOMS
TROUBLE SWALLOWING: 1
SHORTNESS OF BREATH: 0
COLOR CHANGE: 0
DIARRHEA: 0
WHEEZING: 0
CONSTIPATION: 0
SINUS PAIN: 0

## 2025-01-21 ASSESSMENT — PATIENT HEALTH QUESTIONNAIRE - PHQ9: DEPRESSION UNABLE TO ASSESS: FUNCTIONAL CAPACITY MOTIVATION LIMITS ACCURACY
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
## 2025-01-21 NOTE — ASSESSMENT & PLAN NOTE
Patient has gone off all medications, Dr. Godfrey agreeable, patient has not had any seizure activity

## 2025-01-23 ENCOUNTER — TELEPHONE (OUTPATIENT)
Dept: FAMILY MEDICINE CLINIC | Age: 45
End: 2025-01-23

## 2025-01-23 ENCOUNTER — HOSPITAL ENCOUNTER (OUTPATIENT)
Age: 45
Setting detail: SPECIMEN
Discharge: HOME | End: 2025-01-23

## 2025-01-23 DIAGNOSIS — E55.9 VITAMIN D DEFICIENCY: ICD-10-CM

## 2025-01-23 DIAGNOSIS — Z13.21 ENCOUNTER FOR VITAMIN DEFICIENCY SCREENING: ICD-10-CM

## 2025-01-23 DIAGNOSIS — R73.01 IFG (IMPAIRED FASTING GLUCOSE): ICD-10-CM

## 2025-01-23 DIAGNOSIS — Z13.29 SCREENING FOR THYROID DISORDER: ICD-10-CM

## 2025-01-23 DIAGNOSIS — Z13.1 SCREENING FOR DIABETES MELLITUS: ICD-10-CM

## 2025-01-23 DIAGNOSIS — Z13.6 ENCOUNTER FOR LIPID SCREENING FOR CARDIOVASCULAR DISEASE: ICD-10-CM

## 2025-01-23 DIAGNOSIS — Z13.0 SCREENING FOR DEFICIENCY ANEMIA: ICD-10-CM

## 2025-01-23 DIAGNOSIS — Z13.220 ENCOUNTER FOR LIPID SCREENING FOR CARDIOVASCULAR DISEASE: ICD-10-CM

## 2025-01-23 LAB
25(OH)D3 SERPL-MCNC: 50.6 NG/ML (ref 30–100)
ALBUMIN SERPL-MCNC: 4.6 G/DL (ref 3.5–5.2)
ALBUMIN/GLOB SERPL: 1.9 {RATIO} (ref 1–2.5)
ALP SERPL-CCNC: 76 U/L (ref 40–129)
ALT SERPL-CCNC: 23 U/L (ref 10–50)
ANION GAP SERPL CALCULATED.3IONS-SCNC: 10 MMOL/L (ref 9–16)
AST SERPL-CCNC: 25 U/L (ref 10–50)
BASOPHILS # BLD: 0.04 K/UL (ref 0–0.2)
BASOPHILS NFR BLD: 1 % (ref 0–2)
BILIRUB SERPL-MCNC: 0.6 MG/DL (ref 0–1.2)
BUN SERPL-MCNC: 11 MG/DL (ref 6–20)
CALCIUM SERPL-MCNC: 9.5 MG/DL (ref 8.6–10.4)
CHLORIDE SERPL-SCNC: 104 MMOL/L (ref 98–107)
CHOLEST SERPL-MCNC: 149 MG/DL (ref 0–199)
CHOLESTEROL/HDL RATIO: 3.4
CO2 SERPL-SCNC: 30 MMOL/L (ref 20–31)
CREAT SERPL-MCNC: 0.9 MG/DL (ref 0.7–1.2)
EOSINOPHIL # BLD: 0.09 K/UL (ref 0–0.44)
EOSINOPHILS RELATIVE PERCENT: 2 % (ref 1–4)
ERYTHROCYTE [DISTWIDTH] IN BLOOD BY AUTOMATED COUNT: 13.2 % (ref 11.8–14.4)
EST. AVERAGE GLUCOSE BLD GHB EST-MCNC: 105 MG/DL
GFR, ESTIMATED: >90 ML/MIN/1.73M2
GLUCOSE P FAST SERPL-MCNC: 89 MG/DL (ref 74–99)
HBA1C MFR BLD: 5.3 % (ref 4–6)
HCT VFR BLD AUTO: 46.2 % (ref 40.7–50.3)
HDLC SERPL-MCNC: 44 MG/DL
HGB BLD-MCNC: 14.8 G/DL (ref 13–17)
IMM GRANULOCYTES # BLD AUTO: <0.03 K/UL (ref 0–0.3)
IMM GRANULOCYTES NFR BLD: 0 %
LDLC SERPL CALC-MCNC: 87 MG/DL (ref 0–100)
LYMPHOCYTES NFR BLD: 1.18 K/UL (ref 1.1–3.7)
LYMPHOCYTES RELATIVE PERCENT: 20 % (ref 24–43)
MCH RBC QN AUTO: 29.5 PG (ref 25.2–33.5)
MCHC RBC AUTO-ENTMCNC: 32 G/DL (ref 28.4–34.8)
MCV RBC AUTO: 92 FL (ref 82.6–102.9)
MONOCYTES NFR BLD: 0.45 K/UL (ref 0.1–1.2)
MONOCYTES NFR BLD: 8 % (ref 3–12)
NEUTROPHILS NFR BLD: 69 % (ref 36–65)
NEUTS SEG NFR BLD: 4.07 K/UL (ref 1.5–8.1)
NRBC BLD-RTO: 0 PER 100 WBC
PLATELET # BLD AUTO: 222 K/UL (ref 138–453)
PMV BLD AUTO: 12.9 FL (ref 8.1–13.5)
POTASSIUM SERPL-SCNC: 4.4 MMOL/L (ref 3.7–5.3)
PROT SERPL-MCNC: 7 G/DL (ref 6.6–8.7)
RBC # BLD AUTO: 5.02 M/UL (ref 4.21–5.77)
SODIUM SERPL-SCNC: 144 MMOL/L (ref 136–145)
T4 FREE SERPL-MCNC: 0.9 NG/DL (ref 0.92–1.68)
TRIGL SERPL-MCNC: 92 MG/DL
TSH SERPL DL<=0.05 MIU/L-ACNC: 1.16 UIU/ML (ref 0.27–4.2)
VLDLC SERPL CALC-MCNC: 18 MG/DL (ref 1–30)
WBC OTHER # BLD: 5.8 K/UL (ref 3.5–11.3)

## 2025-01-23 NOTE — TELEPHONE ENCOUNTER
Zach at [facility] pharmacy called [phone]     Group home Staff Janay [phone] called pharmacy questioning the recent prescription for Zovirax   Where is the cream supposed to be applied - they ( Group Home ) are unable to apply it 5 times a day     Pharmacy just wanted some clarification on the order for them   Where to apply - frequency please  [phone]

## 2025-02-24 DIAGNOSIS — E55.9 VITAMIN D DEFICIENCY: ICD-10-CM

## 2025-02-24 DIAGNOSIS — R94.5 LIVER FUNCTION ABNORMALITY: ICD-10-CM

## 2025-02-24 NOTE — TELEPHONE ENCOUNTER
LOV: 1/21/2025    RTO:   Future Appointments   Date Time Provider Department Center   2/26/2025  2:00 PM [facility] NUTRITION COUNSELING RM 1 [facility]   1/26/2026  9:00 AM Amos Yang APRN - CNP [place] [facility] DEP     LRF: 11/5/24          Controlled Substance Monitoring:    Acute and Chronic Pain Monitoring:        No data to display

## 2025-02-25 RX ORDER — CHOLECALCIFEROL (VITAMIN D3) 50 MCG
TABLET ORAL
Qty: 31 TABLET | Refills: 0 | Status: SHIPPED | OUTPATIENT
Start: 2025-02-25

## 2025-02-25 RX ORDER — FOLIC ACID 1 MG/1
TABLET ORAL
Qty: 31 TABLET | Refills: 0 | Status: SHIPPED | OUTPATIENT
Start: 2025-02-25

## 2025-02-26 ENCOUNTER — HOSPITAL ENCOUNTER (OUTPATIENT)
Dept: NUTRITION | Age: 45
Discharge: HOME | End: 2025-02-26
Payer: MEDICARE

## 2025-02-26 PROCEDURE — 97802 MEDICAL NUTRITION INDIV IN: CPT

## 2025-02-26 NOTE — PROGRESS NOTES
Nutrition Education    Spoke with Evelio and his mother today due to concerns for his low BMI and difficulty swallowing and chewing.     Evelio's mother lives in [place] and visits once a month as well as orders groceries to Evelio's apartment. He lives in an apartment where he has 24hr staff to cook meals and assist with ADL's.     Per mom, over the last few years Evelio has struggled with his ability to chew and swallow as he has started to talk less. Mom stated it can be difficult to understand what he says, causing  people to stop talking to him.     Evelio used to receive ensures through insurance, but has since stopped receiving them. His mother buys the [employer] or store brand as they are more affordable. Writer suggests Evelio to receive them again due to his inability to meet nutritional needs with oral intake and low BMI. He cannot tolerate the chocolate ones per his mother. Writer and mother also discussed his need to see a speech language pathologist to work on his swallowing and chewing, while ensuring he can tolerate oral intake safely.     Evelio's sister is a RN, his mom discussed if he would benefit from an appetite stimulant such as Remeron, writer suggested starting with Evelio working with SLP to see if working on his oral muscles will improve his PO intake.     Provided two handouts: 1800 calorie 5 day meal plan and high kcal, high protein nutrition therapy. Provided 3 chocolate, 3 vanilla, and 3 strawberry ensures to take home.    Educated on High calorie, high protein intake, swallowing and chewing difficulty  Learners: Patient and Family  Readiness: Eager  Method: Explanation and Handout  Response: Verbalizes Understanding and Demonstrated Understanding  Contact name and number provided.    Debbie Hauser MS, RDN, LDN  Contact Number: [phone]

## 2025-03-04 DIAGNOSIS — E78.00 PURE HYPERCHOLESTEROLEMIA: ICD-10-CM

## 2025-03-05 RX ORDER — ATORVASTATIN CALCIUM 20 MG/1
TABLET, FILM COATED ORAL
Qty: 31 TABLET | Refills: 5 | Status: SHIPPED | OUTPATIENT
Start: 2025-03-05

## 2025-03-05 NOTE — TELEPHONE ENCOUNTER
LOV: 1/21/2025    RTO:   Future Appointments   Date Time Provider Department Center   1/26/2026  9:00 AM Amos Yang APRN - CNP [facility] [place] [facility] DEP     LRF: 11/5/24          Controlled Substance Monitoring:    Acute and Chronic Pain Monitoring:        No data to display

## 2025-03-20 DIAGNOSIS — R94.5 LIVER FUNCTION ABNORMALITY: ICD-10-CM

## 2025-03-20 DIAGNOSIS — E55.9 VITAMIN D DEFICIENCY: ICD-10-CM

## 2025-03-20 RX ORDER — FOLIC ACID 1 MG/1
TABLET ORAL
Qty: 31 TABLET | Refills: 4 | Status: SHIPPED | OUTPATIENT
Start: 2025-03-20

## 2025-03-20 RX ORDER — CHOLECALCIFEROL (VITAMIN D3) 50 MCG
TABLET ORAL
Qty: 31 TABLET | Refills: 4 | Status: SHIPPED | OUTPATIENT
Start: 2025-03-20

## 2025-03-20 NOTE — TELEPHONE ENCOUNTER
LOV 1/21/25   RTO 1/26/26  LRF 2/25/25          Controlled Substance Monitoring:    Acute and Chronic Pain Monitoring:        No data to display

## 2025-03-25 DIAGNOSIS — K59.04 CHRONIC IDIOPATHIC CONSTIPATION: ICD-10-CM

## 2025-03-25 RX ORDER — POLYETHYLENE GLYCOL 3350 17 G/17G
POWDER, FOR SOLUTION ORAL
Qty: 119 G | Refills: 12 | Status: SHIPPED | OUTPATIENT
Start: 2025-03-25

## 2025-03-25 NOTE — TELEPHONE ENCOUNTER
[facility] pharmacy called ([place] [phone] )   Asking for a verbal to change the gram's of the polyethylene glycol from 119 GM. To 238 GM - that would cover the month.       Writer communicated with RC- Chin HANNON verbal consent obtained-   Pharmacy was called with the approved change of 238 GM as well of the script the RC sent in this morning with 12 refills

## 2025-03-25 NOTE — TELEPHONE ENCOUNTER
LOV 1/21/2025   RTO 1/26/2026  LRF 11/5/2024          Controlled Substance Monitoring:    Acute and Chronic Pain Monitoring:        No data to display

## 2025-06-12 ENCOUNTER — TELEPHONE (OUTPATIENT)
Dept: NEUROLOGY | Age: 45
End: 2025-06-12

## 2025-06-12 NOTE — TELEPHONE ENCOUNTER
I called to schedule the annual follow up appointment for this patient and I spoke to the patient's mother and she stated that the patient will be moving to [place] in July and will be transferring his care to a new physician in [place].  KS